# Patient Record
Sex: MALE | Race: WHITE | Employment: OTHER | URBAN - METROPOLITAN AREA
[De-identification: names, ages, dates, MRNs, and addresses within clinical notes are randomized per-mention and may not be internally consistent; named-entity substitution may affect disease eponyms.]

---

## 2017-02-14 RX ORDER — ROSUVASTATIN CALCIUM 20 MG/1
TABLET, COATED ORAL
Qty: 30 TABLET | Refills: 0 | Status: SHIPPED | OUTPATIENT
Start: 2017-02-14 | End: 2017-03-20 | Stop reason: SDUPTHER

## 2017-02-17 ENCOUNTER — OFFICE VISIT (OUTPATIENT)
Dept: CARDIOLOGY CLINIC | Age: 62
End: 2017-02-17

## 2017-02-17 VITALS
SYSTOLIC BLOOD PRESSURE: 138 MMHG | DIASTOLIC BLOOD PRESSURE: 74 MMHG | HEIGHT: 67 IN | HEART RATE: 76 BPM | BODY MASS INDEX: 41.59 KG/M2 | WEIGHT: 265 LBS

## 2017-02-17 DIAGNOSIS — I25.10 CORONARY ARTERY DISEASE INVOLVING NATIVE CORONARY ARTERY OF NATIVE HEART WITHOUT ANGINA PECTORIS: Primary | ICD-10-CM

## 2017-02-17 DIAGNOSIS — E78.00 HYPERCHOLESTEREMIA: ICD-10-CM

## 2017-02-17 DIAGNOSIS — I10 ESSENTIAL HYPERTENSION: ICD-10-CM

## 2017-02-17 PROCEDURE — 99214 OFFICE O/P EST MOD 30 MIN: CPT | Performed by: INTERNAL MEDICINE

## 2017-03-21 RX ORDER — ROSUVASTATIN CALCIUM 20 MG/1
TABLET, COATED ORAL
Qty: 30 TABLET | Refills: 0 | Status: SHIPPED | OUTPATIENT
Start: 2017-03-21 | End: 2017-04-20 | Stop reason: SDUPTHER

## 2017-04-21 RX ORDER — ROSUVASTATIN CALCIUM 20 MG/1
TABLET, COATED ORAL
Qty: 30 TABLET | Refills: 0 | Status: SHIPPED | OUTPATIENT
Start: 2017-04-21 | End: 2017-06-06

## 2017-06-06 RX ORDER — ROSUVASTATIN CALCIUM 20 MG/1
TABLET, COATED ORAL
Qty: 30 TABLET | Refills: 0 | Status: SHIPPED | OUTPATIENT
Start: 2017-06-06 | End: 2017-07-06 | Stop reason: SDUPTHER

## 2017-06-20 ENCOUNTER — TELEPHONE (OUTPATIENT)
Dept: CARDIOLOGY CLINIC | Age: 62
End: 2017-06-20

## 2017-07-10 RX ORDER — CLOPIDOGREL BISULFATE 75 MG/1
TABLET ORAL
Qty: 30 TABLET | Refills: 6 | Status: ON HOLD | OUTPATIENT
Start: 2017-07-10 | End: 2017-12-08 | Stop reason: HOSPADM

## 2017-07-10 RX ORDER — ROSUVASTATIN CALCIUM 20 MG/1
TABLET, COATED ORAL
Qty: 30 TABLET | Refills: 1 | Status: SHIPPED | OUTPATIENT
Start: 2017-07-10 | End: 2017-09-12 | Stop reason: SDUPTHER

## 2017-07-10 RX ORDER — METOPROLOL TARTRATE 50 MG/1
TABLET, FILM COATED ORAL
Qty: 60 TABLET | Refills: 6 | Status: SHIPPED | OUTPATIENT
Start: 2017-07-10 | End: 2018-05-24 | Stop reason: SDUPTHER

## 2017-08-18 ENCOUNTER — OFFICE VISIT (OUTPATIENT)
Dept: CARDIOLOGY CLINIC | Age: 62
End: 2017-08-18

## 2017-08-18 DIAGNOSIS — I25.10 CORONARY ARTERY DISEASE INVOLVING NATIVE CORONARY ARTERY OF NATIVE HEART WITHOUT ANGINA PECTORIS: Primary | ICD-10-CM

## 2017-08-18 DIAGNOSIS — I48.0 PAROXYSMAL ATRIAL FIBRILLATION (HCC): ICD-10-CM

## 2017-08-18 DIAGNOSIS — E78.00 HYPERCHOLESTEREMIA: ICD-10-CM

## 2017-08-18 DIAGNOSIS — I10 ESSENTIAL HYPERTENSION: ICD-10-CM

## 2017-08-18 PROCEDURE — 99999 PR OFFICE/OUTPT VISIT,PROCEDURE ONLY: CPT | Performed by: INTERNAL MEDICINE

## 2017-09-14 RX ORDER — ROSUVASTATIN CALCIUM 20 MG/1
TABLET, COATED ORAL
Qty: 30 TABLET | Refills: 1 | Status: SHIPPED | OUTPATIENT
Start: 2017-09-14 | End: 2017-11-22 | Stop reason: SDUPTHER

## 2017-11-22 RX ORDER — ROSUVASTATIN CALCIUM 20 MG/1
TABLET, COATED ORAL
Qty: 30 TABLET | Refills: 1 | Status: SHIPPED | OUTPATIENT
Start: 2017-11-22 | End: 2018-08-10 | Stop reason: SDUPTHER

## 2017-12-07 PROBLEM — I21.4 NSTEMI (NON-ST ELEVATED MYOCARDIAL INFARCTION) (HCC): Status: ACTIVE | Noted: 2017-12-07

## 2017-12-26 ENCOUNTER — OFFICE VISIT (OUTPATIENT)
Dept: CARDIOLOGY CLINIC | Age: 62
End: 2017-12-26

## 2017-12-26 VITALS
SYSTOLIC BLOOD PRESSURE: 118 MMHG | HEART RATE: 64 BPM | OXYGEN SATURATION: 95 % | DIASTOLIC BLOOD PRESSURE: 64 MMHG | BODY MASS INDEX: 44.22 KG/M2 | WEIGHT: 274 LBS

## 2017-12-26 DIAGNOSIS — I25.110 CORONARY ARTERY DISEASE INVOLVING NATIVE CORONARY ARTERY OF NATIVE HEART WITH UNSTABLE ANGINA PECTORIS (HCC): Chronic | ICD-10-CM

## 2017-12-26 DIAGNOSIS — I10 ESSENTIAL HYPERTENSION: Chronic | ICD-10-CM

## 2017-12-26 DIAGNOSIS — E78.2 MIXED HYPERLIPIDEMIA: Primary | Chronic | ICD-10-CM

## 2017-12-26 PROCEDURE — 99214 OFFICE O/P EST MOD 30 MIN: CPT | Performed by: NURSE PRACTITIONER

## 2017-12-26 NOTE — LETTER
[address] Cardiology [place] [address] [place] [address] [address] [address]  Phone: [phone]  Fax: [address] [facility],         December 26, 2017     Mikhail [facility] [address]    Patient: Trevor Al  MR Number: [id number]  YOB: 1955  Date of Visit: 12/26/2017    Dear Dr. Mikhail Fox:    Thank you for the request for consultation for Trevor Al to me for the evaluation of CAD. Below are the relevant portions of my assessment and plan of care. [address]     Outpatient Follow Up Note    CHIEF COMPLAINT / HPI: Hospital Follow Up secondary to coronary artery disease/ HTN/ Hyperlipidemia     Hospital record has been reviewed  Hospital Course progressed as follows:  [address] [place] [address] Dr Martinez. Mr Stoney Cho with h/o CAD was admitted with chest pain, had NSTEMI, cath was done and did had RCA stent placed. His plavix was changed to prasugrel. Other home medications were continued. Trevor Al is 60 y.o. male who presents today for a routine follow up after a recent hospitalization related to the above mentioned issues. Subjective:   Since the time of discharge, the patient admits their symptoms have significantly improved. Currently the patient denies significant chest pain. There is no associated ADRIAN. The patient is not experiencing palpitations. These symptoms are improving over the last few weeks. With regard to medication therapy the patient has been compliant with prescribed regimen. They have tolerated therapy to date.      Past Medical History:   Diagnosis Date    CAD (coronary artery disease)     Hyperlipidemia     Hypertension      Social History:    History   Smoking Status    Former Smoker    Quit date: 12/28/2000   Smokeless Tobacco    Never Used     Current Medications:  Current Outpatient Prescriptions   Medication Sig Dispense Refill

## 2017-12-26 NOTE — COMMUNICATION BODY
[facility]     Outpatient Follow Up Note    CHIEF COMPLAINT / HPI: Hospital Follow Up secondary to coronary artery disease/ HTN/ Hyperlipidemia     Hospital record has been reviewed  Hospital Course progressed as follows:  [facility] Dr Martinez. Mr Bethany Morales with h/o CAD was admitted with chest pain, had NSTEMI, cath was done and did had RCA stent placed. His plavix was changed to prasugrel. Other home medications were continued. Paula Meyers is 60 y.o. male who presents today for a routine follow up after a recent hospitalization related to the above mentioned issues. Subjective:   Since the time of discharge, the patient admits their symptoms have significantly improved. Currently the patient denies significant chest pain. There is no associated ADRIAN. The patient is not experiencing palpitations. These symptoms are improving over the last few weeks. With regard to medication therapy the patient has been compliant with prescribed regimen. They have tolerated therapy to date.      Past Medical History:   Diagnosis Date    CAD (coronary artery disease)     Hyperlipidemia     Hypertension      Social History:    History   Smoking Status    Former Smoker    Quit date: 12/28/2000   Smokeless Tobacco    Never Used     Current Medications:  Current Outpatient Prescriptions   Medication Sig Dispense Refill    prasugrel (EFFIENT) 10 MG TABS Take 1 tablet by mouth daily 30 tablet 3    nitroGLYCERIN (NITROSTAT) 0.4 MG SL tablet Place 0.4 mg under the tongue      rosuvastatin (CRESTOR) 20 MG tablet TAKE ONE TABLET BY MOUTH ONCE DAILY (Patient taking differently: TAKE ONE TABLET BY MOUTH ONCE nightly) 30 tablet 1    metoprolol tartrate (LOPRESSOR) 50 MG tablet TAKE ONE TABLET BY MOUTH TWICE DAILY 60 tablet 6    aspirin 81 MG tablet Take 4 tablets by mouth daily 30 tablet 11    albuterol sulfate  (90 BASE) MCG/ACT inhaler Inhale 2 puffs into the lungs every 6 hours as needed for Wheezing      Ranitidine HCl (RANITIDINE 75 PO) Take 1 tablet by mouth as needed       losartan (COZAAR) 25 MG tablet Take 25 mg by mouth daily. No current facility-administered medications for this visit. REVIEW OF SYSTEMS:   CONSTITUTIONAL: No major weight gain or loss, fatigue, weakness, night sweats or fever. There's been no change in energy level, sleep pattern, or activity level. HEENT: No new vision difficulties or ringing in the ears. RESPIRATORY: No new SOB, PND, orthopnea or cough. CARDIOVASCULAR: See HPI  GI: No nausea, vomiting, diarrhea, constipation, abdominal pain or changes in bowel habits. : No urinary frequency, urgency, incontinence hematuria or dysuria. SKIN: No cyanosis or skin lesions. MUSCULOSKELETAL: No new muscle or joint pain. NEUROLOGICAL: No syncope or TIA-like symptoms. PSYCHIATRIC: No anxiety, pain, insomnia or depression    Objective:   PHYSICAL EXAM:        VITALS:  /64   Pulse 64   Wt 274 lb (124.3 kg)   SpO2 95%   BMI 44.22 kg/m²      CONSTITUTIONAL: Cooperative, no apparent distress, and appears well nourished / developed  NEUROLOGIC:  Awake and orientated to person, place and time. PSYCH: Calm affect. SKIN: Warm and dry. HEENT: Sclera non-icteric, normocephalic, neck supple, no elevation of JVP, normal carotid pulses with no bruits and thyroid normal size. LUNGS:  No increased work of breathing and clear to auscultation, no crackles or wheezing. CARDIOVASCULAR:  Regular rate and rhythm with no murmurs, gallops, rubs, or abnormal heart sounds, normal PMI. The apical impulses not displaced.                              Heart tones are crisp and normal                                                                Cervical veins are not engorged                 JVP less than 8 cm H2O                                                                              The carotid upstroke is normal in amplitude and contour without delay or bruit ABDOMEN:  Normal bowel sounds, non-distended and non-tender to palpation   EXT: No edema, no calf tenderness. Pulses are present bilaterally. DATA:    Lab Results   Component Value Date    ALT 21 12/07/2017    AST 16 12/07/2017    ALKPHOS 97 12/07/2017    BILITOT 0.3 12/07/2017     Lab Results   Component Value Date    CREATININE 0.9 12/08/2017    BUN 10 12/08/2017     12/08/2017    K 4.2 12/08/2017     12/08/2017    CO2 26 12/08/2017     No results found for: TSH, K5XVDMU, Z2GMDZU, THYROIDAB  Lab Results   Component Value Date    WBC 8.2 12/07/2017    HGB 16.0 12/07/2017    HCT 46.1 12/07/2017    MCV 90.6 12/07/2017     12/07/2017     No components found for: CHLPL  Lab Results   Component Value Date    TRIG 113 10/15/2016    TRIG 167 (H) 07/09/2016    TRIG 228 (H) 12/29/2015     Lab Results   Component Value Date    HDL 36 (L) 10/15/2016    HDL 37 (L) 07/09/2016    HDL 26 (L) 12/29/2015     Lab Results   Component Value Date    LDLCALC 72 10/15/2016    LDLCALC 158 (H) 07/09/2016    LDLCALC 136 (H) 12/29/2015     Lab Results   Component Value Date    LABVLDL 23 10/15/2016    LABVLDL 33 07/09/2016    LABVLDL 46 12/29/2015     Radiology Review:  Pertinent images / reports were reviewed as a part of this visit and reveals the following:    Cardiac Angiogram/ PCI of RCA: 12/7/17  Cath with patent LIMA to LAD,patent SVG to intermediate  95% RCA at proximal stent edge. Normal EF,LVEDP 20  Trop . 11 c/w nonQ MI'     PCI with 3.5 x 12 Alpine DIANA in mid RCA with stent beyond that dilated with 3.5 balloon  Excellent result     LHC 12/15   MVD->CABG   MPI 12/15 NL Normal perfusion   TTE 1/16 60%             Assessment:   Coronary artery disease  12/7/17: Cardiac angiogram/ PCI of RCA- DIANA  Currently on ASA, Lopressor, Losartan, Effient, and Crestor  Patient denies any anginal symptoms at today's office visit    Hypertension  Today's B/P- 118/64  Continue current medications    Hyperlipidemia  On Crestor  10/115/16: HDL: 36, LDL: 72  Patient  is stable since hospital discharge. Plan:   Continue current medications  Patient denied cardiac rehab  Follow up in three months     I have addressed the patient's cardiac risk factors and adjusted pharmacologic treatment as needed. In addition, I have reinforced the need for patient directed risk factor modification. Further evaluation will be based upon the patient's clinical course and testing results. All questions and concerns were addressed to the patient/family. Alternatives to  treatment were discussed. The patient  currently  is not smoking. The risks related to smoking were reviewed with the patient. Recommend maintaining a smoke-free lifestyle. Products available for smoking cessation were discussed. Dual Antiplatelet therapy / anti-coagulation has been recommended / prescribed for this patient. Education conducted on adverse reactions including bleeding was discussed. The patient verbalizes understanding. Pt is on a BB: Lopressor  Pt is on an ace-i/ARB: Losartan  Pt is on a statin  : Crestor  Saturated fat diet discussed  Exercise program discussed    Thank you for allowing to us to participate in the care of Yuliana Amaya CNP

## 2017-12-26 NOTE — PROGRESS NOTES
[address]     Outpatient Follow Up Note    CHIEF COMPLAINT / HPI: Hospital Follow Up secondary to coronary artery disease/ HTN/ Hyperlipidemia     Hospital record has been reviewed  Hospital Course progressed as follows:  [address] Martinez. Mr [place] with h/o CAD was admitted with chest pain, had NSTEMI, cath was done and did had RCA stent placed. His plavix was changed to prasugrel. Other home medications were continued. Verner Dexter is 60 y.o. male who presents today for a routine follow up after a recent hospitalization related to the above mentioned issues. Subjective:   Since the time of discharge, the patient admits their symptoms have significantly improved. Currently the patient denies significant chest pain. There is no associated ADRIAN. The patient is not experiencing palpitations. These symptoms are improving over the last few weeks. With regard to medication therapy the patient has been compliant with prescribed regimen. They have tolerated therapy to date.      Past Medical History:   Diagnosis Date    CAD (coronary artery disease)     Hyperlipidemia     Hypertension      Social History:    History   Smoking Status    Former Smoker    Quit date: 12/28/2000   Smokeless Tobacco    Never Used     Current Medications:  Current Outpatient Prescriptions   Medication Sig Dispense Refill    prasugrel (EFFIENT) 10 MG TABS Take 1 tablet by mouth daily 30 tablet 3    nitroGLYCERIN (NITROSTAT) 0.4 MG SL tablet Place 0.4 mg under the tongue      rosuvastatin (CRESTOR) 20 MG tablet TAKE ONE TABLET BY MOUTH ONCE DAILY (Patient taking differently: TAKE ONE TABLET BY MOUTH ONCE nightly) 30 tablet 1    metoprolol tartrate (LOPRESSOR) 50 MG tablet TAKE ONE TABLET BY MOUTH TWICE DAILY 60 tablet 6    aspirin 81 MG tablet Take 4 tablets by mouth daily 30 tablet 11    albuterol sulfate  (90 BASE) MCG/ACT inhaler Inhale 2 puffs into the lungs every 6 hours as needed for Wheezing      Ranitidine HCl (RANITIDINE 75 PO) Take 1 tablet by mouth as needed       losartan (COZAAR) 25 MG tablet Take 25 mg by mouth daily. No current facility-administered medications for this visit. REVIEW OF SYSTEMS:   CONSTITUTIONAL: No major weight gain or loss, fatigue, weakness, night sweats or fever. There's been no change in energy level, sleep pattern, or activity level. HEENT: No new vision difficulties or ringing in the ears. RESPIRATORY: No new SOB, PND, orthopnea or cough. CARDIOVASCULAR: See HPI  GI: No nausea, vomiting, diarrhea, constipation, abdominal pain or changes in bowel habits. : No urinary frequency, urgency, incontinence hematuria or dysuria. SKIN: No cyanosis or skin lesions. MUSCULOSKELETAL: No new muscle or joint pain. NEUROLOGICAL: No syncope or TIA-like symptoms. PSYCHIATRIC: No anxiety, pain, insomnia or depression    Objective:   PHYSICAL EXAM:        VITALS:  /64   Pulse 64   Wt 274 lb (124.3 kg)   SpO2 95%   BMI 44.22 kg/m²     CONSTITUTIONAL: Cooperative, no apparent distress, and appears well nourished / developed  NEUROLOGIC:  Awake and orientated to person, place and time. PSYCH: Calm affect. SKIN: Warm and dry. HEENT: Sclera non-icteric, normocephalic, neck supple, no elevation of JVP, normal carotid pulses with no bruits and thyroid normal size. LUNGS:  No increased work of breathing and clear to auscultation, no crackles or wheezing. CARDIOVASCULAR:  Regular rate and rhythm with no murmurs, gallops, rubs, or abnormal heart sounds, normal PMI. The apical impulses not displaced.                              Heart tones are crisp and normal                                                                Cervical veins are not engorged                 JVP less than 8 cm H2O                                                                              The carotid upstroke is normal in amplitude and contour without delay or bruit ABDOMEN:  Normal bowel sounds, non-distended and non-tender to palpation   EXT: No edema, no calf tenderness. Pulses are present bilaterally. DATA:    Lab Results   Component Value Date    ALT 21 12/07/2017    AST 16 12/07/2017    ALKPHOS 97 12/07/2017    BILITOT 0.3 12/07/2017     Lab Results   Component Value Date    CREATININE 0.9 12/08/2017    BUN 10 12/08/2017     12/08/2017    K 4.2 12/08/2017     12/08/2017    CO2 26 12/08/2017     No results found for: TSH, J7TKSFK, J2KOELA, THYROIDAB  Lab Results   Component Value Date    WBC 8.2 12/07/2017    HGB 16.0 12/07/2017    HCT 46.1 12/07/2017    MCV 90.6 12/07/2017     12/07/2017     No components found for: CHLPL  Lab Results   Component Value Date    TRIG 113 10/15/2016    TRIG 167 (H) 07/09/2016    TRIG 228 (H) 12/29/2015     Lab Results   Component Value Date    HDL 36 (L) 10/15/2016    HDL 37 (L) 07/09/2016    HDL 26 (L) 12/29/2015     Lab Results   Component Value Date    LDLCALC 72 10/15/2016    LDLCALC 158 (H) 07/09/2016    LDLCALC 136 (H) 12/29/2015     Lab Results   Component Value Date    LABVLDL 23 10/15/2016    LABVLDL 33 07/09/2016    LABVLDL 46 12/29/2015     Radiology Review:  Pertinent images / reports were reviewed as a part of this visit and reveals the following:    Cardiac Angiogram/ PCI of RCA: 12/7/17  Cath with patent LIMA to LAD,patent SVG to intermediate  95% RCA at proximal stent edge. Normal EF,LVEDP 20  Trop . 11 c/w nonQ MI'     PCI with 3.5 x 12 Alpine DIANA in mid RCA with stent beyond that dilated with 3.5 balloon  Excellent result     LHC 12/15   MVD->CABG   MPI 12/15 NL Normal perfusion   TTE 1/16 60%             Assessment:   Coronary artery disease  12/7/17: Cardiac angiogram/ PCI of RCA- DIANA  Currently on ASA, Lopressor, Losartan, Effient, and Crestor  Patient denies any anginal symptoms at today's office visit    Hypertension  Today's B/P- 118/64  Continue current medications    Hyperlipidemia  On Crestor  10/115/16: HDL: 36, LDL: 72  Patient  is stable since hospital discharge. Plan:   Continue current medications  Patient denied cardiac rehab  Follow up in three months     I have addressed the patient's cardiac risk factors and adjusted pharmacologic treatment as needed. In addition, I have reinforced the need for patient directed risk factor modification. Further evaluation will be based upon the patient's clinical course and testing results. All questions and concerns were addressed to the patient/family. Alternatives to  treatment were discussed. The patient  currently  is not smoking. The risks related to smoking were reviewed with the patient. Recommend maintaining a smoke-free lifestyle. Products available for smoking cessation were discussed. Dual Antiplatelet therapy / anti-coagulation has been recommended / prescribed for this patient. Education conducted on adverse reactions including bleeding was discussed. The patient verbalizes understanding. Pt is on a BB: Lopressor  Pt is on an ace-i/ARB: Losartan  Pt is on a statin  : Crestor  Saturated fat diet discussed  Exercise program discussed    Thank you for allowing to us to participate in the care of Yuliana Amaya CNP

## 2018-03-09 ENCOUNTER — TELEPHONE (OUTPATIENT)
Dept: CARDIOLOGY CLINIC | Age: 63
End: 2018-03-09

## 2018-03-28 ENCOUNTER — TELEPHONE (OUTPATIENT)
Dept: CARDIOLOGY CLINIC | Age: 63
End: 2018-03-28

## 2018-03-28 RX ORDER — PRASUGREL 10 MG/1
10 TABLET, FILM COATED ORAL DAILY
Qty: 90 TABLET | Refills: 3 | Status: SHIPPED | OUTPATIENT
Start: 2018-03-28 | End: 2019-03-04 | Stop reason: SDUPTHER

## 2018-03-28 RX ORDER — PRASUGREL 10 MG/1
10 TABLET, FILM COATED ORAL DAILY
Qty: 30 TABLET | Refills: 3 | Status: CANCELLED | OUTPATIENT
Start: 2018-03-28

## 2018-04-02 ENCOUNTER — TELEPHONE (OUTPATIENT)
Dept: CARDIOLOGY CLINIC | Age: 63
End: 2018-04-02

## 2018-05-24 RX ORDER — METOPROLOL TARTRATE 50 MG/1
TABLET, FILM COATED ORAL
Qty: 60 TABLET | Refills: 6 | Status: SHIPPED | OUTPATIENT
Start: 2018-05-24 | End: 2021-01-21 | Stop reason: SDUPTHER

## 2018-08-10 RX ORDER — ROSUVASTATIN CALCIUM 20 MG/1
TABLET, COATED ORAL
Qty: 30 TABLET | Refills: 1 | Status: SHIPPED | OUTPATIENT
Start: 2018-08-10 | End: 2019-08-06

## 2019-03-06 RX ORDER — PRASUGREL 10 MG/1
10 TABLET, FILM COATED ORAL DAILY
Qty: 90 TABLET | Refills: 3 | Status: SHIPPED | OUTPATIENT
Start: 2019-03-06 | End: 2021-01-04 | Stop reason: SDUPTHER

## 2019-07-13 ENCOUNTER — APPOINTMENT (OUTPATIENT)
Dept: GENERAL RADIOLOGY | Age: 64
DRG: 247 | End: 2019-07-13
Payer: COMMERCIAL

## 2019-07-13 ENCOUNTER — HOSPITAL ENCOUNTER (INPATIENT)
Age: 64
LOS: 1 days | Discharge: HOME | DRG: 247 | End: 2019-07-16
Attending: EMERGENCY MEDICINE | Admitting: PEDIATRICS
Payer: COMMERCIAL

## 2019-07-13 DIAGNOSIS — R07.9 CHEST PAIN, UNSPECIFIED TYPE: Primary | ICD-10-CM

## 2019-07-13 LAB
A/G RATIO: 1.2 (ref 1.1–2.2)
ALBUMIN SERPL-MCNC: 3.9 G/DL (ref 3.4–5)
ALP BLD-CCNC: 102 U/L (ref 40–129)
ALT SERPL-CCNC: 18 U/L (ref 10–40)
ANION GAP SERPL CALCULATED.3IONS-SCNC: 11 MMOL/L (ref 3–16)
AST SERPL-CCNC: 20 U/L (ref 15–37)
BASOPHILS ABSOLUTE: 0.1 K/UL (ref 0–0.2)
BASOPHILS RELATIVE PERCENT: 1.1 %
BILIRUB SERPL-MCNC: 0.3 MG/DL (ref 0–1)
BUN BLDV-MCNC: 10 MG/DL (ref 7–20)
CALCIUM SERPL-MCNC: 9.4 MG/DL (ref 8.3–10.6)
CHLORIDE BLD-SCNC: 103 MMOL/L (ref 99–110)
CO2: 24 MMOL/L (ref 21–32)
CREAT SERPL-MCNC: 0.9 MG/DL (ref 0.8–1.3)
EKG ATRIAL RATE: 79 BPM
EKG DIAGNOSIS: NORMAL
EKG P AXIS: 40 DEGREES
EKG P-R INTERVAL: 206 MS
EKG Q-T INTERVAL: 392 MS
EKG QRS DURATION: 82 MS
EKG QTC CALCULATION (BAZETT): 449 MS
EKG R AXIS: 9 DEGREES
EKG T AXIS: 80 DEGREES
EKG VENTRICULAR RATE: 79 BPM
EOSINOPHILS ABSOLUTE: 0.2 K/UL (ref 0–0.6)
EOSINOPHILS RELATIVE PERCENT: 2.8 %
GFR AFRICAN AMERICAN: >60
GFR NON-AFRICAN AMERICAN: >60
GLOBULIN: 3.3 G/DL
GLUCOSE BLD-MCNC: 121 MG/DL (ref 70–99)
HCT VFR BLD CALC: 47.3 % (ref 40.5–52.5)
HEMOGLOBIN: 15.8 G/DL (ref 13.5–17.5)
LYMPHOCYTES ABSOLUTE: 1.9 K/UL (ref 1–5.1)
LYMPHOCYTES RELATIVE PERCENT: 23.4 %
MCH RBC QN AUTO: 30.5 PG (ref 26–34)
MCHC RBC AUTO-ENTMCNC: 33.4 G/DL (ref 31–36)
MCV RBC AUTO: 91.2 FL (ref 80–100)
MONOCYTES ABSOLUTE: 0.5 K/UL (ref 0–1.3)
MONOCYTES RELATIVE PERCENT: 6 %
NEUTROPHILS ABSOLUTE: 5.4 K/UL (ref 1.7–7.7)
NEUTROPHILS RELATIVE PERCENT: 66.7 %
PDW BLD-RTO: 15.1 % (ref 12.4–15.4)
PLATELET # BLD: 254 K/UL (ref 135–450)
PMV BLD AUTO: 6.8 FL (ref 5–10.5)
POTASSIUM REFLEX MAGNESIUM: 4.5 MMOL/L (ref 3.5–5.1)
RBC # BLD: 5.18 M/UL (ref 4.2–5.9)
SODIUM BLD-SCNC: 138 MMOL/L (ref 136–145)
TOTAL PROTEIN: 7.2 G/DL (ref 6.4–8.2)
TROPONIN: <0.01 NG/ML
WBC # BLD: 8.1 K/UL (ref 4–11)

## 2019-07-13 PROCEDURE — 2580000003 HC RX 258: Performed by: PHYSICIAN ASSISTANT

## 2019-07-13 PROCEDURE — 96361 HYDRATE IV INFUSION ADD-ON: CPT

## 2019-07-13 PROCEDURE — 99285 EMERGENCY DEPT VISIT HI MDM: CPT

## 2019-07-13 PROCEDURE — 6360000002 HC RX W HCPCS: Performed by: PHYSICIAN ASSISTANT

## 2019-07-13 PROCEDURE — 80053 COMPREHEN METABOLIC PANEL: CPT

## 2019-07-13 PROCEDURE — G0378 HOSPITAL OBSERVATION PER HR: HCPCS

## 2019-07-13 PROCEDURE — 93005 ELECTROCARDIOGRAM TRACING: CPT | Performed by: EMERGENCY MEDICINE

## 2019-07-13 PROCEDURE — 84484 ASSAY OF TROPONIN QUANT: CPT

## 2019-07-13 PROCEDURE — 6370000000 HC RX 637 (ALT 250 FOR IP): Performed by: PEDIATRICS

## 2019-07-13 PROCEDURE — 6370000000 HC RX 637 (ALT 250 FOR IP): Performed by: PHYSICIAN ASSISTANT

## 2019-07-13 PROCEDURE — 71045 X-RAY EXAM CHEST 1 VIEW: CPT

## 2019-07-13 PROCEDURE — 85025 COMPLETE CBC W/AUTO DIFF WBC: CPT

## 2019-07-13 PROCEDURE — 96374 THER/PROPH/DIAG INJ IV PUSH: CPT

## 2019-07-13 RX ORDER — ONDANSETRON 2 MG/ML
4 INJECTION INTRAMUSCULAR; INTRAVENOUS ONCE
Status: COMPLETED | OUTPATIENT
Start: 2019-07-13 | End: 2019-07-13

## 2019-07-13 RX ORDER — METOPROLOL TARTRATE 50 MG/1
50 TABLET, FILM COATED ORAL 2 TIMES DAILY
Status: DISCONTINUED | OUTPATIENT
Start: 2019-07-13 | End: 2019-07-16 | Stop reason: HOSPADM

## 2019-07-13 RX ORDER — SODIUM CHLORIDE 0.9 % (FLUSH) 0.9 %
10 SYRINGE (ML) INJECTION EVERY 12 HOURS SCHEDULED
Status: DISCONTINUED | OUTPATIENT
Start: 2019-07-13 | End: 2019-07-16 | Stop reason: HOSPADM

## 2019-07-13 RX ORDER — ONDANSETRON 2 MG/ML
4 INJECTION INTRAMUSCULAR; INTRAVENOUS EVERY 6 HOURS PRN
Status: DISCONTINUED | OUTPATIENT
Start: 2019-07-13 | End: 2019-07-16 | Stop reason: HOSPADM

## 2019-07-13 RX ORDER — LOSARTAN POTASSIUM 25 MG/1
25 TABLET ORAL DAILY
Status: DISCONTINUED | OUTPATIENT
Start: 2019-07-14 | End: 2019-07-15

## 2019-07-13 RX ORDER — SODIUM CHLORIDE 0.9 % (FLUSH) 0.9 %
10 SYRINGE (ML) INJECTION PRN
Status: DISCONTINUED | OUTPATIENT
Start: 2019-07-13 | End: 2019-07-16 | Stop reason: HOSPADM

## 2019-07-13 RX ORDER — ASPIRIN 325 MG
325 TABLET ORAL ONCE
Status: COMPLETED | OUTPATIENT
Start: 2019-07-13 | End: 2019-07-13

## 2019-07-13 RX ORDER — ALBUTEROL SULFATE 90 UG/1
2 AEROSOL, METERED RESPIRATORY (INHALATION) EVERY 6 HOURS PRN
Status: DISCONTINUED | OUTPATIENT
Start: 2019-07-13 | End: 2019-07-16 | Stop reason: HOSPADM

## 2019-07-13 RX ORDER — PRASUGREL 10 MG/1
10 TABLET, FILM COATED ORAL DAILY
Status: DISCONTINUED | OUTPATIENT
Start: 2019-07-14 | End: 2019-07-16 | Stop reason: HOSPADM

## 2019-07-13 RX ORDER — 0.9 % SODIUM CHLORIDE 0.9 %
1000 INTRAVENOUS SOLUTION INTRAVENOUS ONCE
Status: COMPLETED | OUTPATIENT
Start: 2019-07-13 | End: 2019-07-13

## 2019-07-13 RX ORDER — ASPIRIN 325 MG
325 TABLET ORAL DAILY
Status: DISCONTINUED | OUTPATIENT
Start: 2019-07-14 | End: 2019-07-16 | Stop reason: HOSPADM

## 2019-07-13 RX ORDER — ROSUVASTATIN CALCIUM 20 MG/1
20 TABLET, COATED ORAL DAILY
Status: DISCONTINUED | OUTPATIENT
Start: 2019-07-14 | End: 2019-07-16 | Stop reason: HOSPADM

## 2019-07-13 RX ORDER — NITROGLYCERIN 0.4 MG/1
0.4 TABLET SUBLINGUAL ONCE
Status: COMPLETED | OUTPATIENT
Start: 2019-07-13 | End: 2019-07-13

## 2019-07-13 RX ADMIN — ASPIRIN 325 MG ORAL TABLET 325 MG: 325 PILL ORAL at 20:40

## 2019-07-13 RX ADMIN — NITROGLYCERIN 0.4 MG: 0.4 TABLET, ORALLY DISINTEGRATING SUBLINGUAL at 20:40

## 2019-07-13 RX ADMIN — ONDANSETRON 4 MG: 2 INJECTION INTRAMUSCULAR; INTRAVENOUS at 20:40

## 2019-07-13 RX ADMIN — SODIUM CHLORIDE 1000 ML: 9 INJECTION, SOLUTION INTRAVENOUS at 20:40

## 2019-07-13 ASSESSMENT — ENCOUNTER SYMPTOMS
VOMITING: 0
SHORTNESS OF BREATH: 0
WHEEZING: 0
NAUSEA: 0
COLOR CHANGE: 0
ABDOMINAL PAIN: 0

## 2019-07-13 ASSESSMENT — HEART SCORE
ECG: 0
ECG: 0

## 2019-07-13 ASSESSMENT — PAIN SCALES - GENERAL
PAINLEVEL_OUTOF10: 7
PAINLEVEL_OUTOF10: 0
PAINLEVEL_OUTOF10: 6

## 2019-07-13 ASSESSMENT — PAIN DESCRIPTION - PAIN TYPE: TYPE: ACUTE PAIN

## 2019-07-13 ASSESSMENT — PAIN DESCRIPTION - LOCATION: LOCATION: CHEST

## 2019-07-14 LAB
EKG ATRIAL RATE: 71 BPM
EKG DIAGNOSIS: NORMAL
EKG P AXIS: 65 DEGREES
EKG P-R INTERVAL: 214 MS
EKG Q-T INTERVAL: 396 MS
EKG QRS DURATION: 88 MS
EKG QTC CALCULATION (BAZETT): 430 MS
EKG R AXIS: 21 DEGREES
EKG T AXIS: 78 DEGREES
EKG VENTRICULAR RATE: 71 BPM
TROPONIN: 0.03 NG/ML
TROPONIN: <0.01 NG/ML

## 2019-07-14 PROCEDURE — 6360000002 HC RX W HCPCS: Performed by: PEDIATRICS

## 2019-07-14 PROCEDURE — G0378 HOSPITAL OBSERVATION PER HR: HCPCS

## 2019-07-14 PROCEDURE — 96372 THER/PROPH/DIAG INJ SC/IM: CPT

## 2019-07-14 PROCEDURE — 94761 N-INVAS EAR/PLS OXIMETRY MLT: CPT

## 2019-07-14 PROCEDURE — 99223 1ST HOSP IP/OBS HIGH 75: CPT | Performed by: INTERNAL MEDICINE

## 2019-07-14 PROCEDURE — 6370000000 HC RX 637 (ALT 250 FOR IP): Performed by: PEDIATRICS

## 2019-07-14 PROCEDURE — 2580000003 HC RX 258: Performed by: PEDIATRICS

## 2019-07-14 PROCEDURE — 93010 ELECTROCARDIOGRAM REPORT: CPT | Performed by: INTERNAL MEDICINE

## 2019-07-14 PROCEDURE — 36415 COLL VENOUS BLD VENIPUNCTURE: CPT

## 2019-07-14 PROCEDURE — 84484 ASSAY OF TROPONIN QUANT: CPT

## 2019-07-14 PROCEDURE — 6370000000 HC RX 637 (ALT 250 FOR IP): Performed by: NURSE PRACTITIONER

## 2019-07-14 PROCEDURE — 93005 ELECTROCARDIOGRAM TRACING: CPT | Performed by: NURSE PRACTITIONER

## 2019-07-14 RX ORDER — NITROGLYCERIN 0.4 MG/1
0.4 TABLET SUBLINGUAL EVERY 5 MIN PRN
Status: DISCONTINUED | OUTPATIENT
Start: 2019-07-14 | End: 2019-07-16 | Stop reason: HOSPADM

## 2019-07-14 RX ORDER — MORPHINE SULFATE 2 MG/ML
2 INJECTION, SOLUTION INTRAMUSCULAR; INTRAVENOUS
Status: DISCONTINUED | OUTPATIENT
Start: 2019-07-14 | End: 2019-07-16 | Stop reason: HOSPADM

## 2019-07-14 RX ADMIN — NITROGLYCERIN 0.4 MG: 0.4 TABLET, ORALLY DISINTEGRATING SUBLINGUAL at 18:08

## 2019-07-14 RX ADMIN — PRASUGREL HYDROCHLORIDE 10 MG: 10 TABLET, FILM COATED ORAL at 09:23

## 2019-07-14 RX ADMIN — METOPROLOL TARTRATE 50 MG: 50 TABLET, FILM COATED ORAL at 09:23

## 2019-07-14 RX ADMIN — METOPROLOL TARTRATE 50 MG: 50 TABLET, FILM COATED ORAL at 20:57

## 2019-07-14 RX ADMIN — ASPIRIN 325 MG ORAL TABLET 325 MG: 325 PILL ORAL at 09:23

## 2019-07-14 RX ADMIN — ENOXAPARIN SODIUM 120 MG: 100 INJECTION SUBCUTANEOUS at 00:48

## 2019-07-14 RX ADMIN — ENOXAPARIN SODIUM 120 MG: 100 INJECTION SUBCUTANEOUS at 09:29

## 2019-07-14 RX ADMIN — Medication 10 ML: at 00:47

## 2019-07-14 RX ADMIN — METOPROLOL TARTRATE 50 MG: 50 TABLET, FILM COATED ORAL at 00:47

## 2019-07-14 RX ADMIN — ENOXAPARIN SODIUM 120 MG: 100 INJECTION SUBCUTANEOUS at 20:57

## 2019-07-14 RX ADMIN — ROSUVASTATIN CALCIUM 20 MG: 20 TABLET, FILM COATED ORAL at 09:23

## 2019-07-14 RX ADMIN — NITROGLYCERIN 0.4 MG: 0.4 TABLET, ORALLY DISINTEGRATING SUBLINGUAL at 10:27

## 2019-07-14 RX ADMIN — Medication 10 ML: at 09:33

## 2019-07-14 RX ADMIN — LOSARTAN POTASSIUM 25 MG: 25 TABLET ORAL at 09:23

## 2019-07-14 RX ADMIN — Medication 10 ML: at 20:57

## 2019-07-14 ASSESSMENT — PAIN SCALES - GENERAL
PAINLEVEL_OUTOF10: 7
PAINLEVEL_OUTOF10: 0
PAINLEVEL_OUTOF10: 0
PAINLEVEL_OUTOF10: 1
PAINLEVEL_OUTOF10: 0

## 2019-07-14 ASSESSMENT — PAIN DESCRIPTION - PAIN TYPE
TYPE: ACUTE PAIN
TYPE: ACUTE PAIN

## 2019-07-14 ASSESSMENT — PAIN DESCRIPTION - LOCATION
LOCATION: CHEST
LOCATION: CHEST

## 2019-07-14 ASSESSMENT — PAIN DESCRIPTION - ORIENTATION: ORIENTATION: MID

## 2019-07-14 ASSESSMENT — PAIN DESCRIPTION - DIRECTION: RADIATING_TOWARDS: LEFT ARM
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
## 2019-07-14 NOTE — CONSULTS
System:  All other systems reviewed except for that noted above. Pertinent negatives and positives are:       · General: negative for fever, chills   · Ophthalmic ROS: negative for - eye pain or loss of vision  · ENT ROS: negative for - headaches, sore throat   · Respiratory: negative for - cough, sputum  · Cardiovascular: Reviewed in HPI  · Gastrointestinal: negative for - abdominal pain, diarrhea, N/V  · Hematology: negative for - bleeding, blood clots, bruising or jaundice  · Genito-Urinary:  negative for - Dysuria or incontinence  · Musculoskeletal: negative for - Joint swelling, muscle pain  · Neurological: negative for - confusion, dizziness, headaches   · Psychiatric: No anxiety, no depression. · Dermatological: negative for - rash    Physical Examination:  Vitals:    19 0915   BP: 126/72   Pulse: 72   Resp: 20   Temp: 98.1 °F (36.7 °C)   SpO2: 92%      In: 20 [I.V.:20]  Out: -    Wt Readings from Last 3 Encounters:   19 272 lb 4.8 oz (123.5 kg)   17 274 lb (124.3 kg)   17 260 lb (117.9 kg)     Temp  Av.8 °F (36.6 °C)  Min: 97 °F (36.1 °C)  Max: 98.2 °F (36.8 °C)  Pulse  Av.9  Min: 55  Max: 79  BP  Min: 114/67  Max: 199/68  SpO2  Av.4 %  Min: 92 %  Max: 98 %    Intake/Output Summary (Last 24 hours) at 2019 1126  Last data filed at 2019 0933  Gross per 24 hour   Intake 20 ml   Output --   Net 20 ml       · Telemetry: Sinus rhythm   · Constitutional: Oriented. No distress. · Head: Normocephalic and atraumatic. · Mouth/Throat: Oropharynx is clear and moist.   · Eyes: Conjunctivae normal. EOM are normal.   · Neck: Neck supple. No rigidity. No JVD present. · Cardiovascular: Normal rate, regular rhythm, S1&S2. · Pulmonary/Chest: Bilateral respiratory sounds. No wheezes, No rhonchi. · Abdominal: Soft. Bowel sounds present. No distension, No tenderness. + Obese  · Musculoskeletal: No tenderness. No edema    · Lymphadenopathy: Has no cervical adenopathy. · Neurological: Alert and oriented. Cranial nerve appears intact, No Gross deficit   · Skin: Skin is warm and dry. No rash noted. · Psychiatric: Has a normal behavior     Labs, diagnostic and imaging results reviewed. Reviewed. Recent Labs     07/13/19 2019      K 4.5      CO2 24   BUN 10   CREATININE 0.9     Recent Labs     07/13/19 2019   WBC 8.1   HGB 15.8   HCT 47.3   MCV 91.2        Lab Results   Component Value Date    TROPONINI 0.03 07/14/2019     Lab Results   Component Value Date     01/31/2014    BNP 16 01/04/2013     Lab Results   Component Value Date    PROTIME 11.9 12/29/2015    INR 1.04 12/29/2015     Lab Results   Component Value Date    CHOL 131 10/15/2016    HDL 36 10/15/2016    TRIG 113 10/15/2016       ECG: Sinus rhythm with prolonged HI interval  Echo: 2017  -Bandages on Sternum limited this 2D LVED echocardiogram.   -Left ventricle size is normal.   -Normal left ventricular wall thickness.   -Overall left ventricular function is normal.   -Ejection fraction is visually estimated to be 55-60 %. Myoview 2015   -Normal myocardial perfusion.    -Normal LV function.    -Abnormal EKG response. with development of LBBB and intermittent incomplete    LBBB.    -Chest discomfort with lexiscan infusion. Cath: 2017  Cath with patent LIMA to LAD,patent SVG to intermediate  95% RCA at proximal stent edge. Normal EF,LVEDP 20  Trop . 11 c/w nonQ MI'     PCI with 3.5 x 12 Alpine DIANA in mid RCA with stent beyond that dilated with 3.5 balloon  Excellent result    Scheduled Meds:   losartan  25 mg Oral Daily    aspirin  325 mg Oral Daily    metoprolol tartrate  50 mg Oral BID    prasugrel  10 mg Oral Daily    rosuvastatin  20 mg Oral Daily    sodium chloride flush  10 mL Intravenous 2 times per day    enoxaparin  1 mg/kg Subcutaneous BID    pneumococcal 13-valent conjugate  0.5 mL Intramuscular Prior to discharge     Continuous Infusions:  PRN Meds:.morphine,

## 2019-07-15 LAB
EKG ATRIAL RATE: 74 BPM
EKG DIAGNOSIS: NORMAL
EKG P AXIS: 52 DEGREES
EKG P-R INTERVAL: 218 MS
EKG Q-T INTERVAL: 390 MS
EKG QRS DURATION: 84 MS
EKG QTC CALCULATION (BAZETT): 432 MS
EKG R AXIS: 26 DEGREES
EKG T AXIS: 81 DEGREES
EKG VENTRICULAR RATE: 74 BPM
LEFT VENTRICULAR EJECTION FRACTION HIGH VALUE: 60 %
LEFT VENTRICULAR EJECTION FRACTION MODE: NORMAL
POC ACT LR: 246 SEC

## 2019-07-15 PROCEDURE — 99152 MOD SED SAME PHYS/QHP 5/>YRS: CPT

## 2019-07-15 PROCEDURE — B2111ZZ FLUOROSCOPY OF MULTIPLE CORONARY ARTERIES USING LOW OSMOLAR CONTRAST: ICD-10-PCS | Performed by: INTERNAL MEDICINE

## 2019-07-15 PROCEDURE — 99152 MOD SED SAME PHYS/QHP 5/>YRS: CPT | Performed by: INTERNAL MEDICINE

## 2019-07-15 PROCEDURE — 6370000000 HC RX 637 (ALT 250 FOR IP): Performed by: PEDIATRICS

## 2019-07-15 PROCEDURE — 92928 PRQ TCAT PLMT NTRAC ST 1 LES: CPT | Performed by: INTERNAL MEDICINE

## 2019-07-15 PROCEDURE — B2151ZZ FLUOROSCOPY OF LEFT HEART USING LOW OSMOLAR CONTRAST: ICD-10-PCS | Performed by: INTERNAL MEDICINE

## 2019-07-15 PROCEDURE — C1760 CLOSURE DEV, VASC: HCPCS

## 2019-07-15 PROCEDURE — C1894 INTRO/SHEATH, NON-LASER: HCPCS

## 2019-07-15 PROCEDURE — C9600 PERC DRUG-EL COR STENT SING: HCPCS

## 2019-07-15 PROCEDURE — 99153 MOD SED SAME PHYS/QHP EA: CPT

## 2019-07-15 PROCEDURE — 6370000000 HC RX 637 (ALT 250 FOR IP): Performed by: NURSE PRACTITIONER

## 2019-07-15 PROCEDURE — 6360000004 HC RX CONTRAST MEDICATION: Performed by: INTERNAL MEDICINE

## 2019-07-15 PROCEDURE — 94760 N-INVAS EAR/PLS OXIMETRY 1: CPT

## 2019-07-15 PROCEDURE — 93459 L HRT ART/GRFT ANGIO: CPT | Performed by: INTERNAL MEDICINE

## 2019-07-15 PROCEDURE — 93010 ELECTROCARDIOGRAM REPORT: CPT | Performed by: INTERNAL MEDICINE

## 2019-07-15 PROCEDURE — 2709999900 HC NON-CHARGEABLE SUPPLY

## 2019-07-15 PROCEDURE — 2580000003 HC RX 258: Performed by: INTERNAL MEDICINE

## 2019-07-15 PROCEDURE — C1874 STENT, COATED/COV W/DEL SYS: HCPCS

## 2019-07-15 PROCEDURE — 027034Z DILATION OF CORONARY ARTERY, ONE ARTERY WITH DRUG-ELUTING INTRALUMINAL DEVICE, PERCUTANEOUS APPROACH: ICD-10-PCS | Performed by: INTERNAL MEDICINE

## 2019-07-15 PROCEDURE — C1769 GUIDE WIRE: HCPCS

## 2019-07-15 PROCEDURE — 2580000003 HC RX 258

## 2019-07-15 PROCEDURE — 2580000003 HC RX 258: Performed by: PEDIATRICS

## 2019-07-15 PROCEDURE — 4A023N7 MEASUREMENT OF CARDIAC SAMPLING AND PRESSURE, LEFT HEART, PERCUTANEOUS APPROACH: ICD-10-PCS | Performed by: INTERNAL MEDICINE

## 2019-07-15 PROCEDURE — 6360000002 HC RX W HCPCS

## 2019-07-15 PROCEDURE — 2500000003 HC RX 250 WO HCPCS

## 2019-07-15 PROCEDURE — 93459 L HRT ART/GRFT ANGIO: CPT

## 2019-07-15 PROCEDURE — 85347 COAGULATION TIME ACTIVATED: CPT

## 2019-07-15 PROCEDURE — B2131ZZ FLUOROSCOPY OF MULTIPLE CORONARY ARTERY BYPASS GRAFTS USING LOW OSMOLAR CONTRAST: ICD-10-PCS | Performed by: INTERNAL MEDICINE

## 2019-07-15 PROCEDURE — 1200000000 HC SEMI PRIVATE

## 2019-07-15 PROCEDURE — C1725 CATH, TRANSLUMIN NON-LASER: HCPCS

## 2019-07-15 PROCEDURE — 6370000000 HC RX 637 (ALT 250 FOR IP): Performed by: INTERNAL MEDICINE

## 2019-07-15 PROCEDURE — C1887 CATHETER, GUIDING: HCPCS

## 2019-07-15 RX ORDER — VALSARTAN 160 MG/1
160 TABLET ORAL DAILY
Status: DISCONTINUED | OUTPATIENT
Start: 2019-07-15 | End: 2019-07-16

## 2019-07-15 RX ORDER — SODIUM CHLORIDE 9 MG/ML
INJECTION, SOLUTION INTRAVENOUS CONTINUOUS
Status: ACTIVE | OUTPATIENT
Start: 2019-07-15 | End: 2019-07-15

## 2019-07-15 RX ORDER — SODIUM CHLORIDE 0.9 % (FLUSH) 0.9 %
10 SYRINGE (ML) INJECTION EVERY 12 HOURS SCHEDULED
Status: DISCONTINUED | OUTPATIENT
Start: 2019-07-15 | End: 2019-07-16 | Stop reason: HOSPADM

## 2019-07-15 RX ORDER — ACETAMINOPHEN 325 MG/1
650 TABLET ORAL EVERY 4 HOURS PRN
Status: DISCONTINUED | OUTPATIENT
Start: 2019-07-15 | End: 2019-07-16 | Stop reason: HOSPADM

## 2019-07-15 RX ORDER — SODIUM CHLORIDE 0.9 % (FLUSH) 0.9 %
10 SYRINGE (ML) INJECTION PRN
Status: DISCONTINUED | OUTPATIENT
Start: 2019-07-15 | End: 2019-07-16 | Stop reason: HOSPADM

## 2019-07-15 RX ADMIN — METOPROLOL TARTRATE 50 MG: 50 TABLET, FILM COATED ORAL at 20:55

## 2019-07-15 RX ADMIN — Medication 10 ML: at 20:56

## 2019-07-15 RX ADMIN — VALSARTAN 160 MG: 160 TABLET, FILM COATED ORAL at 13:42

## 2019-07-15 RX ADMIN — LOSARTAN POTASSIUM 25 MG: 25 TABLET ORAL at 08:48

## 2019-07-15 RX ADMIN — Medication 10 ML: at 08:49

## 2019-07-15 RX ADMIN — ROSUVASTATIN CALCIUM 20 MG: 20 TABLET, FILM COATED ORAL at 13:42

## 2019-07-15 RX ADMIN — PRASUGREL HYDROCHLORIDE 10 MG: 10 TABLET, FILM COATED ORAL at 08:48

## 2019-07-15 RX ADMIN — IOPAMIDOL 136 ML: 755 INJECTION, SOLUTION INTRAVENOUS at 10:25

## 2019-07-15 RX ADMIN — METOPROLOL TARTRATE 50 MG: 50 TABLET, FILM COATED ORAL at 08:47

## 2019-07-15 RX ADMIN — ASPIRIN 325 MG ORAL TABLET 325 MG: 325 PILL ORAL at 08:47

## 2019-07-15 ASSESSMENT — PAIN SCALES - GENERAL
PAINLEVEL_OUTOF10: 0
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
## 2019-07-16 VITALS
TEMPERATURE: 97.4 F | SYSTOLIC BLOOD PRESSURE: 127 MMHG | WEIGHT: 268.3 LBS | HEART RATE: 71 BPM | OXYGEN SATURATION: 98 % | BODY MASS INDEX: 42.11 KG/M2 | RESPIRATION RATE: 16 BRPM | HEIGHT: 67 IN | DIASTOLIC BLOOD PRESSURE: 56 MMHG

## 2019-07-16 LAB
ANION GAP SERPL CALCULATED.3IONS-SCNC: 11 MMOL/L (ref 3–16)
BUN BLDV-MCNC: 13 MG/DL (ref 7–20)
CALCIUM SERPL-MCNC: 8.7 MG/DL (ref 8.3–10.6)
CHLORIDE BLD-SCNC: 106 MMOL/L (ref 99–110)
CHOLESTEROL, TOTAL: 155 MG/DL (ref 0–199)
CO2: 26 MMOL/L (ref 21–32)
CREAT SERPL-MCNC: 0.9 MG/DL (ref 0.8–1.3)
GFR AFRICAN AMERICAN: >60
GFR NON-AFRICAN AMERICAN: >60
GLUCOSE BLD-MCNC: 95 MG/DL (ref 70–99)
HDLC SERPL-MCNC: 26 MG/DL (ref 40–60)
LDL CHOLESTEROL CALCULATED: 92 MG/DL
POTASSIUM SERPL-SCNC: 4.5 MMOL/L (ref 3.5–5.1)
SODIUM BLD-SCNC: 143 MMOL/L (ref 136–145)
TRIGL SERPL-MCNC: 187 MG/DL (ref 0–150)
VLDLC SERPL CALC-MCNC: 37 MG/DL

## 2019-07-16 PROCEDURE — 99233 SBSQ HOSP IP/OBS HIGH 50: CPT | Performed by: INTERNAL MEDICINE

## 2019-07-16 PROCEDURE — 90670 PCV13 VACCINE IM: CPT | Performed by: NURSE PRACTITIONER

## 2019-07-16 PROCEDURE — 6370000000 HC RX 637 (ALT 250 FOR IP): Performed by: INTERNAL MEDICINE

## 2019-07-16 PROCEDURE — 80048 BASIC METABOLIC PNL TOTAL CA: CPT

## 2019-07-16 PROCEDURE — 80061 LIPID PANEL: CPT

## 2019-07-16 PROCEDURE — G0009 ADMIN PNEUMOCOCCAL VACCINE: HCPCS | Performed by: NURSE PRACTITIONER

## 2019-07-16 PROCEDURE — 6360000002 HC RX W HCPCS: Performed by: NURSE PRACTITIONER

## 2019-07-16 PROCEDURE — 6370000000 HC RX 637 (ALT 250 FOR IP): Performed by: NURSE PRACTITIONER

## 2019-07-16 RX ORDER — LOSARTAN POTASSIUM 25 MG/1
25 TABLET ORAL DAILY
Status: DISCONTINUED | OUTPATIENT
Start: 2019-07-16 | End: 2019-07-16 | Stop reason: HOSPADM

## 2019-07-16 RX ADMIN — ASPIRIN 325 MG ORAL TABLET 325 MG: 325 PILL ORAL at 08:11

## 2019-07-16 RX ADMIN — PNEUMOCOCCAL 13-VALENT CONJUGATE VACCINE 0.5 ML: 2.2; 2.2; 2.2; 2.2; 2.2; 4.4; 2.2; 2.2; 2.2; 2.2; 2.2; 2.2; 2.2 INJECTION, SUSPENSION INTRAMUSCULAR at 08:11

## 2019-07-16 RX ADMIN — PRASUGREL HYDROCHLORIDE 10 MG: 10 TABLET, FILM COATED ORAL at 08:10

## 2019-07-16 RX ADMIN — METOPROLOL TARTRATE 50 MG: 50 TABLET, FILM COATED ORAL at 08:11

## 2019-07-16 RX ADMIN — ROSUVASTATIN CALCIUM 20 MG: 20 TABLET, FILM COATED ORAL at 08:10

## 2019-07-16 RX ADMIN — LOSARTAN POTASSIUM 25 MG: 25 TABLET ORAL at 08:11

## 2019-07-16 ASSESSMENT — PAIN SCALES - GENERAL
PAINLEVEL_OUTOF10: 0
PAINLEVEL_OUTOF10: 0

## 2019-07-16 NOTE — DISCHARGE SUMMARY
[facility]    Patient Demographics    Kathy. Charles Galvan  Date of Birth. 1955  MRN. [id number]     Primary care provider. Jania Tabares MD  (Tel: [phone])    Admit date: 7/13/2019    Discharge date (blank if same as Note Date): 7/16/2019  Note Date: 7/16/2019     Reason for Hospitalization. Chief Complaint   Patient presents with    Chest Pain     chest pain that radiates down L arm that started this morning. getting worse, with sob and nausea. Significant Findings. Principal Problem:    Unstable angina [facility])  Active Problems:    Hyperlipemia    Essential hypertension    Chest pain    Coronary artery disease involving native coronary artery of native heart with unstable angina pectoris ([address]    Problems and results from this hospitalization that need follow up. 1. Coronary artery disease-post cath follow-up    Significant test results and incidental findings. 1. Cathresults see below    Invasive procedures and treatments. 1. Left heart cath by Dr. Lia Nair on July 15     Cath with COLEMAN to LAD patent,SVG to either intermediate or diag is widely patent     RCA widely patent with previously placed stent. 100% LAD, OM1 with marked progression of disease with 95% OM1  EF 65%     Discussed need for smoking cessation. Likely crestor not effectively controlling lipids     PCI with 3 x18 [address] stent with excellent angiographic result  [facility] Course. Patient is a pleasant 69-year-old male with a history of coronary artery disease who presented with left-sided chest pain radiating down his left arm into his neck and jaw. It worsened with exertion and improved with rest.  It was also associated with nausea. Initial troponin in the emergency room was negative. EKG revealed first-degree AV block. He was admitted and started on full dose Lovenox.   Serial DAILY  Notes to patient:  Use: treat heart failure, prevent future heart attacks, lower blood pressure and heart rate, treat chest pain  Side effects: dizziness, fatigue, and diarrhea     nitroGLYCERIN 0.4 MG SL tablet  Commonly known as:  NITROSTAT  Notes to patient:  Use: Treat chest pain  Side effects: headache, flushing, dizziness    ** Take 1 tablet every 5 minutes for chest pain up to 3 times. Call doctor right away. prasugrel 10 MG Tabs  Commonly known as:  EFFIENT  TAKE 1 TABLET BY MOUTH DAILY  Notes to patient:  Use: prevents closing of your stent. Side effects: Bleeding or bruising more easily     RANITIDINE 75 PO  Notes to patient:  Use: Treat stomach ulcer and GI reflux  Side Effects: Nausea, constipation, diarrhea, dizziness     rosuvastatin 20 MG tablet  Commonly known as:  CRESTOR  TAKE ONE TABLET BY MOUTH ONCE DAILY  Notes to patient:  Uses: to lower cholesterol and triglycerides, to slow the progress of heart disease  Side effects: headache, belly pain, upset stomach, joint pain, weakness            Discharge recommendations given to patient. Follow Up. Cardiology in 1 week   Disposition. home  Activity. No heavy lifting      Spent 34 minutes in discharge process. Signed:   Suzanne Edwards PA-C     7/16/2019 11:45 AM

## 2019-07-16 NOTE — PROGRESS NOTES
4 Eyes Skin Assessment     The patient is being assess for  Transfer to New Unit    I agree that 2 RN's have performed a thorough Head to Toe Skin Assessment on the patient. ALL assessment sites listed below have been assessed. Areas assessed by both nurses: all  [x]   Head, Face, and Ears   [x]   Shoulders, Back, and Chest  [x]   Arms, Elbows, and Hands   [x]   Coccyx, Sacrum, and IschIum2[x]   Legs, Feet, and Heels        Does the Patient have Skin Breakdown?   No         Kofi Prevention initiated:  No   Wound Care Orders initiated:  No      [facility] nurse consulted for Pressure Injury (Stage 3,4, Unstageable, DTI, NWPT, and Complex wounds), New and Established Ostomies:  No      Nurse 1 eSignature: Electronically signed by Tsosie Cockayne, RN on 7/14/19 at 11:53 AM    **SHARE this note so that the co-signing nurse is able to place an eSignature**    Nurse 2 eSignature: Electronically signed by Armond Fisher RN on 7/14/19 at 11:55 AM
Assisted patient with set-up of home []CPAP / [x]Bi-PAP unit without Oxygen bleed-in. [employer] contacted for equipment safety verification.
EKG completed.
MD placed orders for 12 lead EKG stat, and consult to cardiology. Respiratory called and will complete EKG.   Ena Hazel
[facility]    7/14/2019 8:35 AM        Name: Kailey Whiting . Admitted: 7/13/2019  Primary Care Provider: Сергей Lindsay MD (Tel: [phone])      Subjective: Marycarolyn Jason Pt seen urgently this am at request of RN  Pt had walked to the bathroom and developed chest pain 8/10 that radiated down the left arm  Pain improved to level 3 after 15 minutes of rest.  Stat EKG requested. Pt requests SL NTG     States it \"feels the same as it did when I had a blockage\"    Hx CABG 2016  Also stents placed 2013 and 2017  Compliant with effient and all meds but he continues to smoke daily ( 1/3 pack day )     Pt retires Next Friday July 19 th     Reviewed interval ancillary notes    Current Medications    losartan (COZAAR) tablet 25 mg Daily   aspirin tablet 325 mg Daily   albuterol sulfate  (90 Base) MCG/ACT inhaler 2 puff Q6H PRN   metoprolol tartrate (LOPRESSOR) tablet 50 mg BID   prasugrel (EFFIENT) tablet 10 mg Daily   rosuvastatin (CRESTOR) tablet 20 mg Daily   sodium chloride flush 0.9 % injection 10 mL 2 times per day   sodium chloride flush 0.9 % injection 10 mL PRN   ondansetron (ZOFRAN) injection 4 mg Q6H PRN   enoxaparin (LOVENOX) injection 120 mg BID   pneumococcal 13-valent conjugate (PREVNAR) injection 0.5 mL Prior to discharge       Objective:  /67   Pulse 55   Temp 98.2 °F (36.8 °C) (Temporal)   Resp 20   Ht 5' 7\" (1.702 m)   Wt 272 lb 4.8 oz (123.5 kg)   SpO2 95%   BMI 42.65 kg/m²     Intake/Output Summary (Last 24 hours) at 7/14/2019 0835  Last data filed at 7/14/2019 0047  Gross per 24 hour   Intake 10 ml   Output --   Net 10 ml    Wt Readings from Last 3 Encounters:   07/14/19 272 lb 4.8 oz (123.5 kg)   12/26/17 274 lb (124.3 kg)   12/07/17 260 lb (117.9 kg)       General appearance:  Appears comfortable in bed at present time.   Skin dry, color natural .  He is obese   Eyes: Sclera
Pt to cath lab via bed on telemetry with cath lab staff
VSS. AM labs WNL. Right groin puncture site stable. No complaints of pain overnight.   Should d/c later today
S1S2, no murmur, rub or gallop  · Palpation:  Nl PMI  · JVP:  normal  · Extremities: No Edema  Abdomen:  · Soft, non-tender  · Normal bowel sounds  Extremities:  ·  No Cyanosis or Clubbing  Neurological/Psychiatric:  · Oriented to time, place, and person  · Non-anxious  Skin Warm and dry    Assessment:    Patient Active Hospital Problem List:   [facility] (1/3/2013)    Assessment: s/p PCI of marginal with DIANA    Plan: same meds,go back to losartan   Hyperlipemia (1/9/2013)    Assessment: lipid profile in 2 months and consider PCSK9 if not at goal     Essential hypertension ()        Coronary artery disease involving native coronary artery of native heart with unstable angina pectoris ([facility] ()    Assessment: resolved unstable angina with treatment          Plan:  1. F/up in 1-3 weeks,off work this week,retiring at end of week  2.  Will need sl nitro  3.   4. I have spent  35  minutes of face to face time with the patient with more than 50%  spent  counseling and coordinating care for Mr Daryl Guan spoke with Dr Ruby Sow

## 2019-07-16 NOTE — PLAN OF CARE
Educated patient and/or family on the importance of attending Cardiac Rehab post procedure. Educational flyer provided.
Shelby Sepulveda RN  Outcome: Ongoing  Patient has denied any episodes of angina this shift. Will continue to monitor      Problem: Tissue Perfusion - Cardiopulmonary, Altered:  Goal: Ability to maintain arterial blood gas levels within normal range will improve to within specified parameters  Description  Ability to maintain arterial blood gas levels within normal range will improve to within specified parameters  Outcome: Ongoing    No blood gases ordered this shift      Problem: Tissue Perfusion - Cardiopulmonary, Altered:  Goal: Ability to maintain normal pulse oximetry readings will improve to within specified parameters  Description  Ability to maintain normal pulse oximetry readings will improve to within specified parameters  Outcome: Ongoing   Patient is able to maintain pulse ox readings > 92% on room air. Problem: Tissue Perfusion - Peripheral, Altered:  Goal: Absence of hematoma at arterial access site  Description  Absence of hematoma at arterial access site  7/16/2019 0025 by Haroon Bass RN  Outcome: Ongoing  No hematoma noted at right groin puncture site. Will continue to monitor      Problem: Tissue Perfusion - Peripheral, Altered:  Goal: Absence of signs or symptoms of impaired coagulation  Description  Absence of signs or symptoms of impaired coagulation  Outcome: Ongoing    No s/s of bleeding or impaired coagulation noted. Will continue to monitor      Problem: Tissue Perfusion - Peripheral, Altered:  Goal: Circulatory function of lower extremities is within specified parameters  Description  Circulatory function of lower extremities is within specified parameters  Outcome: Ongoing   Peripheral pulses are easily palpable. Will continue to monitor      Problem: Tissue Perfusion - Peripheral, Altered:  Goal: Strength of peripheral pulses will increase  Description  Strength of peripheral pulses will increase  Outcome: Ongoing   Peripheral pulses are easily palpable.  Will continue to

## 2019-08-06 ENCOUNTER — OFFICE VISIT (OUTPATIENT)
Dept: CARDIOLOGY CLINIC | Age: 64
End: 2019-08-06
Payer: MEDICARE

## 2019-08-06 VITALS
DIASTOLIC BLOOD PRESSURE: 70 MMHG | OXYGEN SATURATION: 94 % | WEIGHT: 272 LBS | BODY MASS INDEX: 42.69 KG/M2 | HEIGHT: 67 IN | SYSTOLIC BLOOD PRESSURE: 130 MMHG | HEART RATE: 94 BPM

## 2019-08-06 DIAGNOSIS — E78.2 MIXED HYPERLIPIDEMIA: Primary | ICD-10-CM

## 2019-08-06 DIAGNOSIS — I10 ESSENTIAL HYPERTENSION: ICD-10-CM

## 2019-08-06 DIAGNOSIS — I25.10 CORONARY ARTERY DISEASE INVOLVING NATIVE CORONARY ARTERY OF NATIVE HEART WITHOUT ANGINA PECTORIS: ICD-10-CM

## 2019-08-06 PROCEDURE — G8598 ASA/ANTIPLAT THER USED: HCPCS | Performed by: NURSE PRACTITIONER

## 2019-08-06 PROCEDURE — 1111F DSCHRG MED/CURRENT MED MERGE: CPT | Performed by: NURSE PRACTITIONER

## 2019-08-06 PROCEDURE — 3017F COLORECTAL CA SCREEN DOC REV: CPT | Performed by: NURSE PRACTITIONER

## 2019-08-06 PROCEDURE — G8417 CALC BMI ABV UP PARAM F/U: HCPCS | Performed by: NURSE PRACTITIONER

## 2019-08-06 PROCEDURE — G8427 DOCREV CUR MEDS BY ELIG CLIN: HCPCS | Performed by: NURSE PRACTITIONER

## 2019-08-06 PROCEDURE — 99214 OFFICE O/P EST MOD 30 MIN: CPT | Performed by: NURSE PRACTITIONER

## 2019-08-06 PROCEDURE — 1123F ACP DISCUSS/DSCN MKR DOCD: CPT | Performed by: NURSE PRACTITIONER

## 2019-08-06 PROCEDURE — 4004F PT TOBACCO SCREEN RCVD TLK: CPT | Performed by: NURSE PRACTITIONER

## 2019-08-06 PROCEDURE — 4040F PNEUMOC VAC/ADMIN/RCVD: CPT | Performed by: NURSE PRACTITIONER

## 2019-08-06 RX ORDER — ROSUVASTATIN CALCIUM 40 MG/1
40 TABLET, COATED ORAL DAILY
Qty: 90 TABLET | Refills: 3 | Status: SHIPPED | OUTPATIENT
Start: 2019-08-06 | End: 2021-01-21 | Stop reason: SDUPTHER

## 2019-08-06 NOTE — LETTER
 aspirin 81 MG tablet Take 4 tablets by mouth daily 30 tablet 11    albuterol sulfate  (90 BASE) MCG/ACT inhaler Inhale 2 puffs into the lungs every 6 hours as needed for Wheezing      Ranitidine HCl (RANITIDINE 75 PO) Take 1 tablet by mouth as needed       losartan (COZAAR) 25 MG tablet Take 25 mg by mouth daily. No current facility-administered medications for this visit. REVIEW OF SYSTEMS:   CONSTITUTIONAL: No major weight gain or loss, fatigue, weakness, night sweats or fever. There's been no change in energy level, sleep pattern, or activity level. HEENT: No new vision difficulties or ringing in the ears. RESPIRATORY: No new SOB, PND, orthopnea or cough. CARDIOVASCULAR: See HPI  GI: No nausea, vomiting, diarrhea, constipation, abdominal pain or changes in bowel habits. : No urinary frequency, urgency, incontinence hematuria or dysuria. SKIN: No cyanosis or skin lesions. MUSCULOSKELETAL: No new muscle or joint pain. NEUROLOGICAL: No syncope or TIA-like symptoms. PSYCHIATRIC: No anxiety, pain, insomnia or depression    Objective:   PHYSICAL EXAM:        VITALS:  /70 (Site: Left Upper Arm, Position: Sitting, Cuff Size: Large Adult)   Pulse 94   Ht 5' 7\" (1.702 m)   Wt 272 lb (123.4 kg)   SpO2 94%   BMI 42.60 kg/m²      CONSTITUTIONAL: Cooperative, no apparent distress, and appears well nourished / developed  NEUROLOGIC:  Awake and orientated to person, place and time. PSYCH: Calm affect. SKIN: Warm and dry. HEENT: Sclera non-icteric, normocephalic, neck supple, no elevation of JVP, normal carotid pulses with no bruits and thyroid normal size. LUNGS:  No increased work of breathing and clear to auscultation, no crackles or wheezing. CARDIOVASCULAR:  Regular rate and rhythm with no murmurs, gallops, rubs, or abnormal heart sounds, normal PMI. The apical impulses not displaced.       Heart tones are crisp and normal   Cervical veins are not engorged

## 2019-08-06 NOTE — PROGRESS NOTES
[facility]     Outpatient Follow Up Note    CHIEF COMPLAINT / HPI: Hospital Follow Up   secondary to coronary artery disease/ HTN/ Hyperlipidemia     Hospital record has been reviewed  Hospital Course progressed as follows: He was admitted with L sided chest pain that radiated down L arm, and was getting worse. He came to ER then cath and PCI,  NO issues  Currently the patient denies significant chest pain. There is no associated ADRIAN. The patient is not experiencing palpitations. These symptoms are improving over the last few weeks. With regard to medication therapy the patient has been compliant with prescribed regimen. They have tolerated therapy to date. He is now retired and trying to walk more    Past Medical History:   Diagnosis Date    CAD (coronary artery disease)     Hyperlipidemia     Hypertension      Social History:    Social History     Tobacco Use   Smoking Status Current Every Day Smoker    Packs/day: 0.75    Types: Cigarettes    Last attempt to quit: 2000    Years since quittin.6   Smokeless Tobacco Never Used     Current Medications:  Current Outpatient Medications   Medication Sig Dispense Refill    prasugrel (EFFIENT) 10 MG TABS TAKE 1 TABLET BY MOUTH DAILY 90 tablet 3    rosuvastatin (CRESTOR) 20 MG tablet TAKE ONE TABLET BY MOUTH ONCE DAILY 30 tablet 1    metoprolol tartrate (LOPRESSOR) 50 MG tablet TAKE ONE TABLET BY MOUTH TWICE DAILY 60 tablet 6    nitroGLYCERIN (NITROSTAT) 0.4 MG SL tablet Place 0.4 mg under the tongue      aspirin 81 MG tablet Take 4 tablets by mouth daily 30 tablet 11    albuterol sulfate  (90 BASE) MCG/ACT inhaler Inhale 2 puffs into the lungs every 6 hours as needed for Wheezing      Ranitidine HCl (RANITIDINE 75 PO) Take 1 tablet by mouth as needed       losartan (COZAAR) 25 MG tablet Take 25 mg by mouth daily. No current facility-administered medications for this visit.       REVIEW OF SYSTEMS: 07/13/2019    ALKPHOS 102 07/13/2019    BILITOT 0.3 07/13/2019     Lab Results   Component Value Date    CREATININE 0.9 07/16/2019    BUN 13 07/16/2019     07/16/2019    K 4.5 07/16/2019     07/16/2019    CO2 26 07/16/2019     No results found for: TSH, D9DMXAM, V5PFGSU, THYROIDAB  Lab Results   Component Value Date    WBC 8.1 07/13/2019    HGB 15.8 07/13/2019    HCT 47.3 07/13/2019    MCV 91.2 07/13/2019     07/13/2019     No components found for: CHLPL  Lab Results   Component Value Date    TRIG 187 (H) 07/16/2019    TRIG 113 10/15/2016    TRIG 167 (H) 07/09/2016     Lab Results   Component Value Date    HDL 26 (L) 07/16/2019    HDL 36 (L) 10/15/2016    HDL 37 (L) 07/09/2016     Lab Results   Component Value Date    LDLCALC 92 07/16/2019    LDLCALC 72 10/15/2016    LDLCALC 158 (H) 07/09/2016     Lab Results   Component Value Date    LABVLDL 37 07/16/2019    LABVLDL 23 10/15/2016    LABVLDL 33 07/09/2016     Radiology Review:  Pertinent images / reports were reviewed as a part of this visit and reveals the following:      Invasive procedures and treatments. Left heart cath by Dr. Sheryl Reynolds on July 15     Cath with COLEMAN to LAD patent,SVG to either intermediate or diag is widely patent     RCA widely patent with previously placed stent. 100% LAD, OM1 with marked progression of disease with 95% OM1  EF 65%     Discussed need for smoking cessation. Likely crestor not effectively controlling lipids     PCI with 3 x18 [address] stent with excellent angiographic result    Cardiac Angiogram/ PCI of RCA: 12/7/17  Cath with patent LIMA to LAD,patent SVG to intermediate  95% RCA at proximal stent edge. Normal EF,LVEDP 20  Trop . 11 c/w nonQ MI'     PCI with 3.5 x 12 Alpine DIANA in mid RCA with stent beyond that dilated with 3.5 balloon  Excellent result     LHC 12/15   MVD->CABG   MPI 12/15 NL Normal perfusion   TTE 1/16 60%             Assessment:   Coronary artery disease  7/19-cath and PCI DIANA mid RCA  12/7/17:

## 2020-01-27 NOTE — PROGRESS NOTES
apical impulses not displaced  · Heart tones are crisp and normal  · Cervical veins are not engorged  · The carotid upstroke is normal in amplitude and contour without delay or bruit  · Peripheral pulses are symmetrical and full  · There is no clubbing, cyanosis of the extremities. · No edema  · Femoral Arteries: 2+ and equal  · Pedal Pulses: 2+ and equal   Abdomen:  · No masses or tenderness  · Liver/Spleen: No Abnormalities Noted  Neurological/Psychiatric:  · Alert and oriented in all spheres  · Moves all extremities well  · Exhibits normal gait balance and coordination  · No abnormalities of mood, affect, memory, mentation, or behavior are noted      Assessment:    1. Coronary artery disease involving native coronary artery of native heart without angina pectoris   ~Patient denies any anginal symptoms  7/19-cath and PCI DIANA mid RCA  12/7/17: Cardiac angiogram/ PCI of RCA- DIANA     2. Essential hypertension-controlled  Blood pressure (!) 140/60, pulse 84, height 5' 6\" (1.676 m), weight 293 lb (132.9 kg), SpO2 96 %. 3. Mixed hyperlipidemia -  On Crestor 20 mg  7/19  TC-155, Trig-187, HDL-26, LDL-92     Achieved smoking cessation! Plan:  Joie Lunsford has a stable cardiac status. Cardiac test and lab results personally reviewed by me during this office visit and discussed. No med changes. Continue risk factor modifications, encouraged healthy eating and weight loss. Call for any change in symptoms. Return for regular follow up in 6 months with labs prior. I appreciate the opportunity of cooperating in the care of this individual.    Miracle Crews. Tony Raymundo M.D., [facility]    Patient's problem list, medications, allergies, past medical, surgical, social and family histories were reviewed and updated as appropriate. Scribe's attestation: This note was scribed in the presence of Dr. Tony Raymundo MD, by Kel De La Torre RN.     The scribe's documentation has been prepared under my direction and personally reviewed by me

## 2020-02-11 ENCOUNTER — OFFICE VISIT (OUTPATIENT)
Dept: CARDIOLOGY CLINIC | Age: 65
End: 2020-02-11
Payer: MEDICARE

## 2020-02-11 VITALS
HEIGHT: 66 IN | WEIGHT: 293 LBS | DIASTOLIC BLOOD PRESSURE: 60 MMHG | SYSTOLIC BLOOD PRESSURE: 140 MMHG | HEART RATE: 84 BPM | BODY MASS INDEX: 47.09 KG/M2 | OXYGEN SATURATION: 96 %

## 2020-02-11 PROCEDURE — G8417 CALC BMI ABV UP PARAM F/U: HCPCS | Performed by: INTERNAL MEDICINE

## 2020-02-11 PROCEDURE — G8427 DOCREV CUR MEDS BY ELIG CLIN: HCPCS | Performed by: INTERNAL MEDICINE

## 2020-02-11 PROCEDURE — 1123F ACP DISCUSS/DSCN MKR DOCD: CPT | Performed by: INTERNAL MEDICINE

## 2020-02-11 PROCEDURE — G8484 FLU IMMUNIZE NO ADMIN: HCPCS | Performed by: INTERNAL MEDICINE

## 2020-02-11 PROCEDURE — 4040F PNEUMOC VAC/ADMIN/RCVD: CPT | Performed by: INTERNAL MEDICINE

## 2020-02-11 PROCEDURE — 4004F PT TOBACCO SCREEN RCVD TLK: CPT | Performed by: INTERNAL MEDICINE

## 2020-02-11 PROCEDURE — 3017F COLORECTAL CA SCREEN DOC REV: CPT | Performed by: INTERNAL MEDICINE

## 2020-02-11 PROCEDURE — 99214 OFFICE O/P EST MOD 30 MIN: CPT | Performed by: INTERNAL MEDICINE

## 2020-08-19 NOTE — PROGRESS NOTES
[facility]   Cardiac Follow Up    Referring Provider:  Gabriela Burden MD     Chief Complaint   Patient presents with    Shortness of Breath     all the time     Hyperlipidemia     no other complaints     Hypertension    Follow-up     6 mo         History of Present Illness:  Mitchel Turner is a 76 y.o. male with a history of coronary artery disease/ HTN/ Hyperlipidemia. Today he states he has been feeling well, enjoying USP. He has stopped consuming caffeine. He has quit smoking, he quit at the time of his stent placement. Denies exertional chest pain, ADRIAN/PND, palpitations, light-headedness, edema. He has not yet had his labs drawn. He has some shortness of breath, he attributes this to weight. Past Medical History:   has a past medical history of CAD (coronary artery disease), Hyperlipidemia, and Hypertension. Surgical History:   has a past surgical history that includes partial nephrectomy; Coronary angioplasty with stent (1/4/13); Coronary artery bypass graft; joint replacement; and joint replacement (Right, 2014). Social History:   reports that he has been smoking cigarettes. He has been smoking about 0.75 packs per day. He has never used smokeless tobacco. He reports current alcohol use. He reports that he does not use drugs. Family History:  family history includes Heart Disease in his brother, mother, and sister; Stroke in his father. Home Medications:  Prior to Admission medications    Medication Sig Start Date End Date Taking?  Authorizing Provider   rosuvastatin (CRESTOR) 40 MG tablet Take 1 tablet by mouth daily 8/6/19  Yes Juno Bacon, APRN - CNP   prasugrel (EFFIENT) 10 MG TABS TAKE 1 TABLET BY MOUTH DAILY 3/6/19  Yes Jailene Hicks MD   metoprolol tartrate (LOPRESSOR) 50 MG tablet TAKE ONE TABLET BY MOUTH TWICE DAILY 5/24/18  Yes Jailene Hicks MD   nitroGLYCERIN (NITROSTAT) 0.4 MG SL tablet Place 0.4 mg under the tongue   Yes Historical Provider, MD   aspirin 81 MG tablet Take 4 tablets by mouth daily 2/17/17  Yes Alexandre Marcial MD   albuterol sulfate  (90 BASE) MCG/ACT inhaler Inhale 2 puffs into the lungs every 6 hours as needed for Wheezing   Yes Historical Provider, MD   losartan (COZAAR) 25 MG tablet Take 25 mg by mouth daily. Yes Historical Provider, MD        Allergies:  Lisinopril     Review of Systems:   · Constitutional: there has been no unanticipated weight loss. There's been no change in energy level, sleep pattern, or activity level. · Eyes: No visual changes or diplopia. No scleral icterus. · ENT: No Headaches, hearing loss or vertigo. No mouth sores or sore throat. · Cardiovascular: Reviewed in HPI  · Respiratory: No cough or wheezing, no sputum production. No hematemesis. · Gastrointestinal: No abdominal pain, appetite loss, blood in stools. No change in bowel or bladder habits. · Genitourinary: No dysuria, trouble voiding, or hematuria. · Musculoskeletal:  No gait disturbance, weakness or joint complaints. · Integumentary: No rash or pruritis. · Neurological: No headache, diplopia, change in muscle strength, numbness or tingling. No change in gait, balance, coordination, mood, affect, memory, mentation, behavior. · Psychiatric: No anxiety, no depression. · Endocrine: No malaise, fatigue or temperature intolerance. No excessive thirst, fluid intake, or urination. No tremor. · Hematologic/Lymphatic: No abnormal bruising or bleeding, blood clots or swollen lymph nodes. · Allergic/Immunologic: No nasal congestion or hives.     Physical Examination:    Vitals:    08/24/20 0959   BP: 130/68   Pulse: 70   SpO2: 96%        Wt Readings from Last 1 Encounters:   08/24/20 299 lb (135.6 kg)       Constitutional and General Appearance:NAD  Skin:good turgor,intact without lesions  HEENT: EOMI ,normal  Neck:no JVD     Respiratory:  · Normal excursion and expansion without use of accessory muscles  · Resp RN.    The scribe's documentation has been prepared under my direction and personally reviewed by me in its entirety. I confirm that the note above accurately reflects all work, treatment, procedures, and medical decision making performed by me.

## 2020-08-24 ENCOUNTER — HOSPITAL ENCOUNTER (OUTPATIENT)
Age: 65
Discharge: HOME | End: 2020-08-24
Payer: MEDICARE

## 2020-08-24 ENCOUNTER — OFFICE VISIT (OUTPATIENT)
Dept: CARDIOLOGY CLINIC | Age: 65
End: 2020-08-24
Payer: MEDICARE

## 2020-08-24 VITALS
DIASTOLIC BLOOD PRESSURE: 68 MMHG | WEIGHT: 299 LBS | OXYGEN SATURATION: 96 % | BODY MASS INDEX: 46.93 KG/M2 | HEIGHT: 67 IN | HEART RATE: 70 BPM | SYSTOLIC BLOOD PRESSURE: 130 MMHG

## 2020-08-24 LAB
ALBUMIN SERPL-MCNC: 4.1 G/DL (ref 3.4–5)
ALP BLD-CCNC: 101 U/L (ref 40–129)
ALT SERPL-CCNC: 18 U/L (ref 10–40)
ANION GAP SERPL CALCULATED.3IONS-SCNC: 9 MMOL/L (ref 3–16)
AST SERPL-CCNC: 18 U/L (ref 15–37)
BASOPHILS ABSOLUTE: 0.1 K/UL (ref 0–0.2)
BASOPHILS RELATIVE PERCENT: 0.7 %
BILIRUB SERPL-MCNC: 0.3 MG/DL (ref 0–1)
BILIRUBIN DIRECT: <0.2 MG/DL (ref 0–0.3)
BILIRUBIN, INDIRECT: NORMAL MG/DL (ref 0–1)
BUN BLDV-MCNC: 12 MG/DL (ref 7–20)
CALCIUM SERPL-MCNC: 9.6 MG/DL (ref 8.3–10.6)
CHLORIDE BLD-SCNC: 106 MMOL/L (ref 99–110)
CHOLESTEROL, TOTAL: 132 MG/DL (ref 0–199)
CO2: 27 MMOL/L (ref 21–32)
CREAT SERPL-MCNC: 1.1 MG/DL (ref 0.8–1.3)
EOSINOPHILS ABSOLUTE: 0.4 K/UL (ref 0–0.6)
EOSINOPHILS RELATIVE PERCENT: 4.7 %
GFR AFRICAN AMERICAN: >60
GFR NON-AFRICAN AMERICAN: >60
GLUCOSE BLD-MCNC: 107 MG/DL (ref 70–99)
HCT VFR BLD CALC: 41.8 % (ref 40.5–52.5)
HDLC SERPL-MCNC: 37 MG/DL (ref 40–60)
HEMOGLOBIN: 13.6 G/DL (ref 13.5–17.5)
LDL CHOLESTEROL CALCULATED: 65 MG/DL
LYMPHOCYTES ABSOLUTE: 1.9 K/UL (ref 1–5.1)
LYMPHOCYTES RELATIVE PERCENT: 23.5 %
MCH RBC QN AUTO: 29 PG (ref 26–34)
MCHC RBC AUTO-ENTMCNC: 32.5 G/DL (ref 31–36)
MCV RBC AUTO: 89.2 FL (ref 80–100)
MONOCYTES ABSOLUTE: 0.7 K/UL (ref 0–1.3)
MONOCYTES RELATIVE PERCENT: 8.8 %
NEUTROPHILS ABSOLUTE: 5.1 K/UL (ref 1.7–7.7)
NEUTROPHILS RELATIVE PERCENT: 62.3 %
PDW BLD-RTO: 15.3 % (ref 12.4–15.4)
PHOSPHORUS: 3.1 MG/DL (ref 2.5–4.9)
PLATELET # BLD: 264 K/UL (ref 135–450)
PMV BLD AUTO: 6.9 FL (ref 5–10.5)
POTASSIUM SERPL-SCNC: 5.2 MMOL/L (ref 3.5–5.1)
RBC # BLD: 4.68 M/UL (ref 4.2–5.9)
SODIUM BLD-SCNC: 142 MMOL/L (ref 136–145)
TOTAL PROTEIN: 7.2 G/DL (ref 6.4–8.2)
TRIGL SERPL-MCNC: 151 MG/DL (ref 0–150)
VLDLC SERPL CALC-MCNC: 30 MG/DL
WBC # BLD: 8.2 K/UL (ref 4–11)

## 2020-08-24 PROCEDURE — 4040F PNEUMOC VAC/ADMIN/RCVD: CPT | Performed by: INTERNAL MEDICINE

## 2020-08-24 PROCEDURE — 80061 LIPID PANEL: CPT

## 2020-08-24 PROCEDURE — 85025 COMPLETE CBC W/AUTO DIFF WBC: CPT

## 2020-08-24 PROCEDURE — 3017F COLORECTAL CA SCREEN DOC REV: CPT | Performed by: INTERNAL MEDICINE

## 2020-08-24 PROCEDURE — G8427 DOCREV CUR MEDS BY ELIG CLIN: HCPCS | Performed by: INTERNAL MEDICINE

## 2020-08-24 PROCEDURE — 80076 HEPATIC FUNCTION PANEL: CPT

## 2020-08-24 PROCEDURE — 1123F ACP DISCUSS/DSCN MKR DOCD: CPT | Performed by: INTERNAL MEDICINE

## 2020-08-24 PROCEDURE — G8417 CALC BMI ABV UP PARAM F/U: HCPCS | Performed by: INTERNAL MEDICINE

## 2020-08-24 PROCEDURE — 4004F PT TOBACCO SCREEN RCVD TLK: CPT | Performed by: INTERNAL MEDICINE

## 2020-08-24 PROCEDURE — 99214 OFFICE O/P EST MOD 30 MIN: CPT | Performed by: INTERNAL MEDICINE

## 2020-08-24 PROCEDURE — 80069 RENAL FUNCTION PANEL: CPT

## 2020-08-24 PROCEDURE — 36415 COLL VENOUS BLD VENIPUNCTURE: CPT

## 2021-01-04 RX ORDER — PRASUGREL 10 MG/1
10 TABLET, FILM COATED ORAL DAILY
Qty: 90 TABLET | Refills: 3 | Status: SHIPPED | OUTPATIENT
Start: 2021-01-04 | End: 2021-10-13

## 2021-01-04 NOTE — TELEPHONE ENCOUNTER
Received refill request for Effient from [facility] DR KIMBERLY GUZMAN.      Last OV: 08/24/2020 w/ LES    Last Labs: 08/24/2020 Hepatic, Renal , CBC , Lipid    Last Refill: 03/06/2019 #90 w/ 3 refills    Next Appointment: on recall list for appt on 02/20/2021 w/ LES

## 2021-01-04 NOTE — TELEPHONE ENCOUNTER
Medication Refill    Medication needing refilled: prasugrel (EFFIENT) 10 MG TABS     Dosage of the medication: 10 mg    How are you taking this medication (QD, BID, TID, QID, PRN): 1 tab daily    30 or 90 day supply called in: 80    Which Pharmacy are we sending the medication to?: [id number] [address], [address] [place] [address],[address] [address]   Phone:  [phone]  Fax:  [phone]

## 2021-01-21 ENCOUNTER — TELEPHONE (OUTPATIENT)
Dept: CARDIOLOGY CLINIC | Age: 66
End: 2021-01-21

## 2021-01-21 RX ORDER — METOPROLOL TARTRATE 50 MG/1
TABLET, FILM COATED ORAL
Qty: 180 TABLET | Refills: 1 | Status: SHIPPED | OUTPATIENT
Start: 2021-01-21 | End: 2021-07-20

## 2021-01-21 RX ORDER — ROSUVASTATIN CALCIUM 40 MG/1
40 TABLET, COATED ORAL DAILY
Qty: 90 TABLET | Refills: 3 | Status: SHIPPED | OUTPATIENT
Start: 2021-01-21

## 2021-01-21 RX ORDER — METOPROLOL TARTRATE 50 MG/1
TABLET, FILM COATED ORAL
Qty: 180 TABLET | Refills: 1 | Status: SHIPPED | OUTPATIENT
Start: 2021-01-21 | End: 2021-01-21 | Stop reason: SDUPTHER

## 2021-01-21 RX ORDER — LOSARTAN POTASSIUM 25 MG/1
25 TABLET ORAL DAILY
Qty: 90 TABLET | Refills: 1 | Status: SHIPPED | OUTPATIENT
Start: 2021-01-21 | End: 2021-07-20

## 2021-01-21 RX ORDER — LOSARTAN POTASSIUM 25 MG/1
25 TABLET ORAL DAILY
Qty: 90 TABLET | Refills: 1 | Status: SHIPPED | OUTPATIENT
Start: 2021-01-21 | End: 2021-01-21 | Stop reason: SDUPTHER

## 2021-01-21 RX ORDER — ROSUVASTATIN CALCIUM 40 MG/1
40 TABLET, COATED ORAL DAILY
Qty: 90 TABLET | Refills: 3 | Status: SHIPPED | OUTPATIENT
Start: 2021-01-21 | End: 2021-01-21 | Stop reason: SDUPTHER

## 2021-01-21 NOTE — TELEPHONE ENCOUNTER
Medication Refill    Medication needing refilled:  Metoprolol, losartan , crestor     Dosage of the medication:    How are you taking this medication (QD, BID, TID, QID, PRN):    30 or 90 day supply called in:    When will you run out of your medication:  Few pills left     Which Pharmacy are we sending the medication to?:  [facility] [address] [facility]

## 2021-07-20 RX ORDER — METOPROLOL TARTRATE 50 MG/1
TABLET, FILM COATED ORAL
Qty: 180 TABLET | Refills: 1 | Status: SHIPPED | OUTPATIENT
Start: 2021-07-20 | End: 2022-02-10 | Stop reason: SDUPTHER

## 2021-07-20 RX ORDER — LOSARTAN POTASSIUM 25 MG/1
25 TABLET ORAL DAILY
Qty: 90 TABLET | Refills: 1 | Status: SHIPPED | OUTPATIENT
Start: 2021-07-20 | End: 2022-02-02 | Stop reason: SDUPTHER

## 2022-02-02 RX ORDER — LOSARTAN POTASSIUM 25 MG/1
25 TABLET ORAL DAILY
Qty: 90 TABLET | Refills: 1 | Status: SHIPPED | OUTPATIENT
Start: 2022-02-02 | End: 2022-02-10 | Stop reason: SDUPTHER

## 2022-02-02 NOTE — TELEPHONE ENCOUNTER
Medication Refill    Medication needing refilled:    losartan (COZAAR) 25 MG tablet [[id number]]      Dosage of the medication:  25 mg    How are you taking this medication (QD, BID, TID, QID, PRN):  1 tablet by mouth daily    30 or 90 day supply called in:  90    When will you run out of your medication:  Patient is out    Which Pharmacy are we sending the medication to?:  [address], [address] [place] [address] [phone] - [phone]   [address] [place] [address], [address] [place] [phone]   Phone:  [phone]  Fax:  [phone]        Medication Refill    Medication needing refilled:    metoprolol tartrate (LOPRESSOR) 50 MG tablet [[id number]]     Dosage of the medication:  50 mg     How are you taking this medication (QD, BID, TID, QID, PRN):  1 tablet by mouth daily    30 or 90 day supply called in:  90    When will you run out of your medication:  Patient is out    Which Pharmacy are we sending the medication to?:  [address], [address] [place] [address] [phone] [facility] [phone]   [address] [place] [address], [address] [facility],[id number]   Phone:  [phone]  Fax:  [phone]

## 2022-02-09 NOTE — TELEPHONE ENCOUNTER
Last OV: 8-24-20  Last labs: 5-20-21 cmp  Appt scheduled : 2-18-22  Last refill:losartan 2-2-22  Lopressor 7-20-21  prasugrel 10-13-21       Losartan is ready for p/u from pharmacy need Rx for lopressor and effient,RX pending.
Medication Refill    Medication needing refilled:  Praugrel (efficient)       Dosage of the medication:  10 mg tabs    How are you taking this medication (QD, BID, TID, QID, PRN):  1 tablet by mouth every day    30 or 90 day supply called in:  90    When will you run out of your medication:  3 days    Which Pharmacy are we sending the medication to?:    [id number]  [address]  Fax: [phone]        Medication Refill    Medication needing refilled:  Losartan (cozaar)    Dosage of the medication:  25 mg    How are you taking this medication (QD, BID, TID, QID, PRN):  1 tablet by mouth daily    30 or 90 day supply called in:  90    When will you run out of your medication:  3 days    Which Pharmacy are we sending the medication to?:    [facility] [address]  Fax: [phone]        Medication Refill      Medication needing refilled:  metroprolol tartrate (Lopressor)    Dosage of the medication:  50 mg    How are you taking this medication (QD, BID, TID, QID, PRN):  1 tablet by mouth twice daily    30 or 90 day supply called in:  90      When will you run out of your medication:  3 days    Which Pharmacy are we sending the medication to?:    [facility] [address], [address] [place] [address]  Fax: [phone]
"You can access the [facility], offered by [facility], by registering with the following website: [email or web address]"

## 2022-02-10 RX ORDER — PRASUGREL 10 MG/1
TABLET, FILM COATED ORAL
Qty: 90 TABLET | Refills: 1 | Status: CANCELLED | OUTPATIENT
Start: 2022-02-10

## 2022-02-10 RX ORDER — LOSARTAN POTASSIUM 25 MG/1
25 TABLET ORAL DAILY
Qty: 90 TABLET | Refills: 1 | Status: SHIPPED | OUTPATIENT
Start: 2022-02-10 | End: 2022-08-10

## 2022-02-10 RX ORDER — PRASUGREL 10 MG/1
TABLET, FILM COATED ORAL
Qty: 90 TABLET | Refills: 0 | Status: SHIPPED | OUTPATIENT
Start: 2022-02-10 | End: 2022-02-18

## 2022-02-10 RX ORDER — PRASUGREL 10 MG/1
TABLET, FILM COATED ORAL
Qty: 90 TABLET | Refills: 1 | Status: SHIPPED | OUTPATIENT
Start: 2022-02-10 | End: 2022-02-18

## 2022-02-10 RX ORDER — LOSARTAN POTASSIUM 25 MG/1
25 TABLET ORAL DAILY
Qty: 90 TABLET | Refills: 1 | Status: CANCELLED | OUTPATIENT
Start: 2022-02-10

## 2022-02-10 RX ORDER — METOPROLOL TARTRATE 50 MG/1
TABLET, FILM COATED ORAL
Qty: 180 TABLET | Refills: 1 | Status: SHIPPED | OUTPATIENT
Start: 2022-02-10 | End: 2022-02-18

## 2022-02-10 RX ORDER — METOPROLOL TARTRATE 50 MG/1
TABLET, FILM COATED ORAL
Qty: 180 TABLET | Refills: 1 | Status: CANCELLED | OUTPATIENT
Start: 2022-02-10

## 2022-02-11 RX ORDER — METOPROLOL TARTRATE 50 MG/1
TABLET, FILM COATED ORAL
Qty: 180 TABLET | Refills: 1 | Status: SHIPPED | OUTPATIENT
Start: 2022-02-11 | End: 2022-08-10

## 2022-02-11 RX ORDER — PRASUGREL 10 MG/1
TABLET, FILM COATED ORAL
Qty: 90 TABLET | Refills: 3 | Status: SHIPPED | OUTPATIENT
Start: 2022-02-11

## 2022-02-18 ENCOUNTER — OFFICE VISIT (OUTPATIENT)
Dept: CARDIOLOGY CLINIC | Age: 67
End: 2022-02-18
Payer: MEDICARE

## 2022-02-18 VITALS
SYSTOLIC BLOOD PRESSURE: 132 MMHG | BODY MASS INDEX: 46.9 KG/M2 | OXYGEN SATURATION: 98 % | HEIGHT: 67 IN | HEART RATE: 68 BPM | WEIGHT: 298.8 LBS | DIASTOLIC BLOOD PRESSURE: 78 MMHG

## 2022-02-18 DIAGNOSIS — E78.49 OTHER HYPERLIPIDEMIA: Chronic | ICD-10-CM

## 2022-02-18 DIAGNOSIS — I25.10 CORONARY ARTERY DISEASE INVOLVING NATIVE CORONARY ARTERY OF NATIVE HEART WITHOUT ANGINA PECTORIS: ICD-10-CM

## 2022-02-18 DIAGNOSIS — R06.09 DOE (DYSPNEA ON EXERTION): ICD-10-CM

## 2022-02-18 DIAGNOSIS — I25.110 CORONARY ARTERY DISEASE INVOLVING NATIVE CORONARY ARTERY OF NATIVE HEART WITH UNSTABLE ANGINA PECTORIS (HCC): Primary | Chronic | ICD-10-CM

## 2022-02-18 PROCEDURE — 4040F PNEUMOC VAC/ADMIN/RCVD: CPT | Performed by: INTERNAL MEDICINE

## 2022-02-18 PROCEDURE — G8427 DOCREV CUR MEDS BY ELIG CLIN: HCPCS | Performed by: INTERNAL MEDICINE

## 2022-02-18 PROCEDURE — G8484 FLU IMMUNIZE NO ADMIN: HCPCS | Performed by: INTERNAL MEDICINE

## 2022-02-18 PROCEDURE — 3017F COLORECTAL CA SCREEN DOC REV: CPT | Performed by: INTERNAL MEDICINE

## 2022-02-18 PROCEDURE — 1123F ACP DISCUSS/DSCN MKR DOCD: CPT | Performed by: INTERNAL MEDICINE

## 2022-02-18 PROCEDURE — 99214 OFFICE O/P EST MOD 30 MIN: CPT | Performed by: INTERNAL MEDICINE

## 2022-02-18 PROCEDURE — G8417 CALC BMI ABV UP PARAM F/U: HCPCS | Performed by: INTERNAL MEDICINE

## 2022-02-18 PROCEDURE — 93000 ELECTROCARDIOGRAM COMPLETE: CPT | Performed by: INTERNAL MEDICINE

## 2022-02-18 PROCEDURE — 1036F TOBACCO NON-USER: CPT | Performed by: INTERNAL MEDICINE

## 2022-02-18 NOTE — PROGRESS NOTES
[address]   Cardiac Follow Up    Referring Provider:  Susannah Guardado MD     Chief Complaint   Patient presents with    Hypertension    Hyperlipidemia    Coronary Artery Disease    1 Year Follow Up      History of Present Illness:  Riya Oconnor is a 78 y.o. male with a history of coronary artery disease/stents, hypertension, hyperlipidemia. Today, he is here for regular follow up. Overall, he feels well. He denies exertional chest pain, ADRIAN/PND, palpitations, light-headedness, edema. Not very active due to orthopedic issues jeni his knees. Past Medical History:   has a past medical history of CAD (coronary artery disease), Hyperlipidemia, and Hypertension. Surgical History:   has a past surgical history that includes partial nephrectomy; Coronary angioplasty with stent (1/4/13); Coronary artery bypass graft; joint replacement; and joint replacement (Right, 2014). Social History:   reports that he quit smoking about 21 years ago. His smoking use included cigarettes. He smoked 0.75 packs per day. He has never used smokeless tobacco. He reports current alcohol use. He reports that he does not use drugs. Family History:  family history includes Heart Disease in his brother, mother, and sister; Stroke in his father. Home Medications:  Prior to Admission medications    Medication Sig Start Date End Date Taking?  Authorizing Provider   metoprolol tartrate (LOPRESSOR) 50 MG tablet TAKE 1 TABLET BY MOUTH TWICE DAILY 2/11/22  Yes Frederick Stratton MD   prasugrel (EFFIENT) 10 MG TABS TAKE 1 TABLET BY MOUTH EVERY DAY 2/11/22  Yes Frederick Stratton MD   losartan (COZAAR) 25 MG tablet Take 1 tablet by mouth daily 2/10/22  Yes Frederick Stratton MD   rosuvastatin (CRESTOR) 40 MG tablet Take 1 tablet by mouth daily 1/21/21  Yes Frederick Stratton MD   nitroGLYCERIN (NITROSTAT) 0.4 MG SL tablet Place 0.4 mg under the tongue   Yes Historical Provider, MD   aspirin 81 MG tablet Take 4 tablets by mouth daily 2/17/17  Yes Micheal Beltrán MD   albuterol sulfate  (90 BASE) MCG/ACT inhaler Inhale 2 puffs into the lungs every 6 hours as needed for Wheezing   Yes Historical Provider, MD        Allergies:  Lisinopril     Review of Systems:   · Constitutional: there has been no unanticipated weight loss. There's been no change in energy level, sleep pattern, or activity level. · Eyes: No visual changes or diplopia. No scleral icterus. · ENT: No Headaches, hearing loss or vertigo. No mouth sores or sore throat. · Cardiovascular: Reviewed in HPI  · Respiratory: No cough or wheezing, no sputum production. No hematemesis. · Gastrointestinal: No abdominal pain, appetite loss, blood in stools. No change in bowel or bladder habits. · Genitourinary: No dysuria, trouble voiding, or hematuria. · Musculoskeletal:  No gait disturbance, weakness or joint complaints. · Integumentary: No rash or pruritis. · Neurological: No headache, diplopia, change in muscle strength, numbness or tingling. No change in gait, balance, coordination, mood, affect, memory, mentation, behavior. · Psychiatric: No anxiety, no depression. · Endocrine: No malaise, fatigue or temperature intolerance. No excessive thirst, fluid intake, or urination. No tremor. · Hematologic/Lymphatic: No abnormal bruising or bleeding, blood clots or swollen lymph nodes. · Allergic/Immunologic: No nasal congestion or hives.     Physical Examination:    Vitals:    02/18/22 1352   BP: 132/78   Pulse: 68   SpO2: 98%        Wt Readings from Last 1 Encounters:   02/18/22 298 lb 12.8 oz (135.5 kg)       Constitutional and General Appearance:NAD  Skin:good turgor,intact without lesions  HEENT: EOMI ,normal  Neck:no JVD     Respiratory:  · Normal excursion and expansion without use of accessory muscles  · Resp Auscultation: Normal breath sounds without dullness  Cardiovascular:  · The apical impulses not displaced  · Heart tones are crisp and normal  · Cervical veins are not engorged  · The carotid upstroke is normal in amplitude and contour without delay or bruit  · Peripheral pulses are symmetrical and full  · There is no clubbing, cyanosis of the extremities. · No edema  · Femoral Arteries: 2+ and equal  · Pedal Pulses: 2+ and equal   Abdomen:  · No masses or tenderness  · Liver/Spleen: No Abnormalities Noted  Neurological/Psychiatric:  · Alert and oriented in all spheres  · Moves all extremities well  · Exhibits normal gait balance and coordination  · No abnormalities of mood, affect, memory, mentation, or behavior are noted      Assessment:    1. Coronary artery disease: Having some SOB. [facility] 7/2019> PCI DIANA mid RCA  [facility] 12/2017> PCI of RCA- DIANA     2. Essential hypertension: Stable  Blood pressure 132/78, pulse 68, height 5' 7\" (1.702 m), weight 298 lb 12.8 oz (135.5 kg), SpO2 98 %. 3. Mixed hyperlipidemia: Stable on Crestor 20 mg       Achieved smoking cessation! Plan:  Leonila Gonzales has a stable cardiac status. Cardiac test and lab results personally reviewed by me during this office visit and discussed. 1. No med changes  2. CMP, CBC, lipids soon  3. Lexiscan celso to assess coronary status soon  4. Return for regular follow up in 6 months    I appreciate the opportunity of cooperating in the care of this individual.    Frances Saab. Awilda Mcintyre M.D., [facility]    Patient's problem list, medications, allergies, past medical, surgical, social and family histories were reviewed and updated as appropriate. Scribe's attestation: This note was scribed in the presence of Dr. Awilda Mcintyre MD, by Leonard Lagos RN. The scribe's documentation has been prepared under my direction and personally reviewed by me in its entirety. I confirm that the note above accurately reflects all work, treatment, procedures, and medical decision making performed by me.

## 2022-02-23 ENCOUNTER — HOSPITAL ENCOUNTER (OUTPATIENT)
Dept: NON INVASIVE DIAGNOSTICS | Age: 67
Discharge: HOME | End: 2022-02-23
Payer: MEDICARE

## 2022-02-23 DIAGNOSIS — R06.09 DOE (DYSPNEA ON EXERTION): ICD-10-CM

## 2022-02-23 DIAGNOSIS — E78.49 OTHER HYPERLIPIDEMIA: Chronic | ICD-10-CM

## 2022-02-23 DIAGNOSIS — I25.110 CORONARY ARTERY DISEASE INVOLVING NATIVE CORONARY ARTERY OF NATIVE HEART WITH UNSTABLE ANGINA PECTORIS (HCC): Chronic | ICD-10-CM

## 2022-02-23 LAB
LV EF: 59 %
LVEF MODALITY: NORMAL

## 2022-02-23 PROCEDURE — 6360000002 HC RX W HCPCS: Performed by: INTERNAL MEDICINE

## 2022-02-23 PROCEDURE — A9502 TC99M TETROFOSMIN: HCPCS | Performed by: INTERNAL MEDICINE

## 2022-02-23 PROCEDURE — 78452 HT MUSCLE IMAGE SPECT MULT: CPT

## 2022-02-23 PROCEDURE — 93017 CV STRESS TEST TRACING ONLY: CPT | Performed by: INTERNAL MEDICINE

## 2022-02-23 PROCEDURE — 3430000000 HC RX DIAGNOSTIC RADIOPHARMACEUTICAL: Performed by: INTERNAL MEDICINE

## 2022-02-23 RX ADMIN — TETROFOSMIN 30 MILLICURIE: 1.38 INJECTION, POWDER, LYOPHILIZED, FOR SOLUTION INTRAVENOUS at 14:22

## 2022-02-23 RX ADMIN — TETROFOSMIN 10 MILLICURIE: 1.38 INJECTION, POWDER, LYOPHILIZED, FOR SOLUTION INTRAVENOUS at 13:04

## 2022-02-23 RX ADMIN — REGADENOSON 0.4 MG: 0.08 INJECTION, SOLUTION INTRAVENOUS at 14:02

## 2022-02-23 NOTE — PROGRESS NOTES
Instructed on Lexiscan Stress Test Procedure including possible side effects/ adverse reactions. Patient verbalizes  understanding and denies having any questions . See [address] [place] [address] Cardiology

## 2022-02-28 ENCOUNTER — TELEPHONE (OUTPATIENT)
Dept: CARDIOLOGY CLINIC | Age: 67
End: 2022-02-28

## 2022-08-05 NOTE — TELEPHONE ENCOUNTER
Received refill request for Losartan, Metoprolol from [facility].     Last ov: 02/18/2022 LES    Last Refill: 02/10/2022    Next appointment: 08/19/2022 LES

## 2022-08-10 RX ORDER — METOPROLOL TARTRATE 50 MG/1
TABLET, FILM COATED ORAL
Qty: 180 TABLET | Refills: 1 | Status: SHIPPED | OUTPATIENT
Start: 2022-08-10

## 2022-08-10 RX ORDER — LOSARTAN POTASSIUM 25 MG/1
TABLET ORAL
Qty: 90 TABLET | Refills: 1 | Status: SHIPPED | OUTPATIENT
Start: 2022-08-10

## 2023-02-07 RX ORDER — LOSARTAN POTASSIUM 25 MG/1
TABLET ORAL
Qty: 90 TABLET | Refills: 1 | Status: SHIPPED | OUTPATIENT
Start: 2023-02-07

## 2023-02-07 RX ORDER — METOPROLOL TARTRATE 50 MG/1
TABLET, FILM COATED ORAL
Qty: 180 TABLET | Refills: 1 | Status: SHIPPED | OUTPATIENT
Start: 2023-02-07

## 2023-02-07 RX ORDER — PRASUGREL 10 MG/1
TABLET, FILM COATED ORAL
Qty: 90 TABLET | Refills: 3 | Status: SHIPPED | OUTPATIENT
Start: 2023-02-07

## 2023-02-07 NOTE — TELEPHONE ENCOUNTER
Received refill request for Losartan, Metoprolol from  [facility]     Last ov: 2022 LES    Last labs: 2022 Care Everywhere    Last EK2022    Last Refill: 08/10/2022 #180 w/ 1 refill    Next appointment: none

## 2023-02-07 NOTE — TELEPHONE ENCOUNTER
Received refill request for Prasugrel from [facility]     Last ov: 02/18/2022 LES    Last labs: 05/20/2021 Care Everywhere    Last Refill: 02/11/2022 #90 w/ 3 refills    Next appointment: none

## 2023-05-18 RX ORDER — METOPROLOL TARTRATE 50 MG/1
TABLET, FILM COATED ORAL
Qty: 180 TABLET | Refills: 1 | Status: SHIPPED | OUTPATIENT
Start: 2023-05-18

## 2023-05-18 RX ORDER — LOSARTAN POTASSIUM 25 MG/1
TABLET ORAL
Qty: 90 TABLET | Refills: 1 | Status: SHIPPED | OUTPATIENT
Start: 2023-05-18

## 2023-05-18 NOTE — TELEPHONE ENCOUNTER
Received refill request for Losartan, Metoprolol from [facility].     Last ov: 02/18/2022 LES    Last Refill: 02/07/2023    Next appointment: none

## 2023-08-30 RX ORDER — LOSARTAN POTASSIUM 25 MG/1
TABLET ORAL
Qty: 90 TABLET | Refills: 1 | OUTPATIENT
Start: 2023-08-30

## 2023-08-30 RX ORDER — LOSARTAN POTASSIUM 25 MG/1
TABLET ORAL
Qty: 90 TABLET | Refills: 1 | Status: SHIPPED | OUTPATIENT
Start: 2023-08-30

## 2023-11-29 RX ORDER — METOPROLOL TARTRATE 50 MG/1
TABLET, FILM COATED ORAL
Qty: 180 TABLET | Refills: 0 | Status: SHIPPED | OUTPATIENT
Start: 2023-11-29

## 2023-11-29 NOTE — TELEPHONE ENCOUNTER
Received refill request for metoprolol from [facility] pharmacy.     Last ov: 02/18/2022 LES    Last Refill: 05/18/2023 #180 w/ 1 refill    Next appointment: none

## 2024-02-09 PROBLEM — Z87.891 FORMER SMOKER: Status: ACTIVE | Noted: 2024-02-09

## 2024-02-27 NOTE — PROGRESS NOTES
[facility]   Cardiac Follow Up    Referring Provider:  Jorge Luis Campos MD     No chief complaint on file.     History of Present Illness:  Ignacio Rosado is a 68 y.o. male with a history of coronary artery disease/stents, hypertension, hyperlipidemia.      Today, he is here for follow up.       Past Medical History:   has a past medical history of CAD (coronary artery disease), Hyperlipidemia, and Hypertension.    Surgical History:   has a past surgical history that includes partial nephrectomy; Coronary angioplasty with stent (1/4/13); Coronary artery bypass graft; joint replacement; and joint replacement (Right, 2014).     Social History:   reports that he quit smoking about 23 years ago. His smoking use included cigarettes. He has never used smokeless tobacco. He reports current alcohol use. He reports that he does not use drugs.     Family History:  family history includes Heart Disease in his brother, mother, and sister; Stroke in his father.     Home Medications:  Prior to Admission medications    Medication Sig Start Date End Date Taking? Authorizing Provider   metoprolol tartrate (LOPRESSOR) 50 MG tablet TAKE 1 TABLET BY MOUTH TWICE DAILY 11/29/23   Jose Garaz MD   losartan (COZAAR) 25 MG tablet TAKE 1 TABLET BY MOUTH EVERY DAY 8/30/23   Jose Garza MD   prasugrel (EFFIENT) 10 MG TABS TAKE 1 TABLET BY MOUTH EVERY DAY 2/7/23   Jose Garza MD   rosuvastatin (CRESTOR) 40 MG tablet Take 1 tablet by mouth daily 1/21/21   Jose Garza MD   nitroGLYCERIN (NITROSTAT) 0.4 MG SL tablet Place 0.4 mg under the tongue    ProviderBrunilda MD   aspirin 81 MG tablet Take 4 tablets by mouth daily 2/17/17   Sher Euceda MD   albuterol sulfate  (90 BASE) MCG/ACT inhaler Inhale 2 puffs into the lungs every 6 hours as needed for Wheezing    ProviderBrunilda MD        Allergies:  Lisinopril     Review of Systems:   Constitutional: there has been no unanticipated weight 
Provider, MD Brunilda   metoprolol tartrate (LOPRESSOR) 50 MG tablet TAKE 1 TABLET BY MOUTH TWICE DAILY 11/29/23  Yes Jose Garza MD   losartan (COZAAR) 25 MG tablet TAKE 1 TABLET BY MOUTH EVERY DAY 8/30/23  Yes Jose Garza MD   prasugrel (EFFIENT) 10 MG TABS TAKE 1 TABLET BY MOUTH EVERY DAY 2/7/23  Yes Jose Garza MD   nitroGLYCERIN (NITROSTAT) 0.4 MG SL tablet Place 1 tablet under the tongue   Yes ProviderBrunilda MD   aspirin 81 MG tablet Take 4 tablets by mouth daily  Patient taking differently: Take 1 tablet by mouth daily 2/17/17  Yes Sher Euceda MD   albuterol sulfate  (90 BASE) MCG/ACT inhaler Inhale 2 puffs into the lungs every 6 hours as needed for Wheezing   Yes Brunilda Lizama MD        Allergies:  Lisinopril     Review of Systems:   Constitutional: there has been no unanticipated weight loss. There's been no change in energy level, sleep pattern, or activity level.     Eyes: No visual changes or diplopia. No scleral icterus.  ENT: No Headaches, hearing loss or vertigo. No mouth sores or sore throat.  Cardiovascular: Reviewed in HPI  Respiratory: No cough or wheezing, no sputum production. No hematemesis.    Gastrointestinal: No abdominal pain, appetite loss, blood in stools. No change in bowel or bladder habits.  Genitourinary: No dysuria, trouble voiding, or hematuria.  Musculoskeletal:  No gait disturbance, weakness or joint complaints.  Integumentary: No rash or pruritis.  Neurological: No headache, diplopia, change in muscle strength, numbness or tingling. No change in gait, balance, coordination, mood, affect, memory, mentation, behavior.  Psychiatric: No anxiety, no depression.  Endocrine: No malaise, fatigue or temperature intolerance. No excessive thirst, fluid intake, or urination. No tremor.  Hematologic/Lymphatic: No abnormal bruising or bleeding, blood clots or swollen lymph nodes.  Allergic/Immunologic: No nasal congestion or hives.    Physical

## 2024-02-28 ENCOUNTER — OFFICE VISIT (OUTPATIENT)
Dept: CARDIOLOGY CLINIC | Age: 69
End: 2024-02-28
Payer: MEDICARE

## 2024-02-28 VITALS
SYSTOLIC BLOOD PRESSURE: 132 MMHG | HEART RATE: 83 BPM | BODY MASS INDEX: 43.85 KG/M2 | WEIGHT: 279.4 LBS | OXYGEN SATURATION: 98 % | HEIGHT: 67 IN | DIASTOLIC BLOOD PRESSURE: 74 MMHG

## 2024-02-28 DIAGNOSIS — E78.49 OTHER HYPERLIPIDEMIA: Chronic | ICD-10-CM

## 2024-02-28 DIAGNOSIS — Z87.891 FORMER SMOKER: ICD-10-CM

## 2024-02-28 DIAGNOSIS — I25.110 CORONARY ARTERY DISEASE INVOLVING NATIVE CORONARY ARTERY OF NATIVE HEART WITH UNSTABLE ANGINA PECTORIS (HCC): Primary | Chronic | ICD-10-CM

## 2024-02-28 DIAGNOSIS — I25.110 CORONARY ARTERY DISEASE INVOLVING NATIVE CORONARY ARTERY OF NATIVE HEART WITH UNSTABLE ANGINA PECTORIS (HCC): Chronic | ICD-10-CM

## 2024-02-28 DIAGNOSIS — I10 HYPERTENSION, ESSENTIAL: ICD-10-CM

## 2024-02-28 LAB
CHOLEST SERPL-MCNC: 208 MG/DL (ref 0–199)
HDLC SERPL-MCNC: 32 MG/DL (ref 40–60)
LDLC SERPL CALC-MCNC: 141 MG/DL
TRIGL SERPL-MCNC: 173 MG/DL (ref 0–150)
VLDLC SERPL CALC-MCNC: 35 MG/DL

## 2024-02-28 PROCEDURE — 3075F SYST BP GE 130 - 139MM HG: CPT | Performed by: INTERNAL MEDICINE

## 2024-02-28 PROCEDURE — G8427 DOCREV CUR MEDS BY ELIG CLIN: HCPCS | Performed by: INTERNAL MEDICINE

## 2024-02-28 PROCEDURE — G8484 FLU IMMUNIZE NO ADMIN: HCPCS | Performed by: INTERNAL MEDICINE

## 2024-02-28 PROCEDURE — G8417 CALC BMI ABV UP PARAM F/U: HCPCS | Performed by: INTERNAL MEDICINE

## 2024-02-28 PROCEDURE — 99214 OFFICE O/P EST MOD 30 MIN: CPT | Performed by: INTERNAL MEDICINE

## 2024-02-28 PROCEDURE — 1036F TOBACCO NON-USER: CPT | Performed by: INTERNAL MEDICINE

## 2024-02-28 PROCEDURE — 1123F ACP DISCUSS/DSCN MKR DOCD: CPT | Performed by: INTERNAL MEDICINE

## 2024-02-28 PROCEDURE — 3078F DIAST BP <80 MM HG: CPT | Performed by: INTERNAL MEDICINE

## 2024-02-28 PROCEDURE — 3017F COLORECTAL CA SCREEN DOC REV: CPT | Performed by: INTERNAL MEDICINE

## 2024-02-28 RX ORDER — FLUTICASONE FUROATE, UMECLIDINIUM BROMIDE AND VILANTEROL TRIFENATATE 100; 62.5; 25 UG/1; UG/1; UG/1
1 POWDER RESPIRATORY (INHALATION) DAILY
COMMUNITY
Start: 2023-06-27

## 2024-02-28 RX ORDER — PRASUGREL 10 MG/1
10 TABLET, FILM COATED ORAL DAILY
Qty: 90 TABLET | Refills: 3 | Status: SHIPPED | OUTPATIENT
Start: 2024-02-28

## 2024-02-28 RX ORDER — LOSARTAN POTASSIUM 25 MG/1
25 TABLET ORAL DAILY
Qty: 90 TABLET | Refills: 3 | Status: SHIPPED | OUTPATIENT
Start: 2024-02-28

## 2024-02-28 RX ORDER — METOPROLOL TARTRATE 50 MG/1
50 TABLET, FILM COATED ORAL 2 TIMES DAILY
Qty: 180 TABLET | Refills: 3 | Status: SHIPPED | OUTPATIENT
Start: 2024-02-28

## 2024-02-28 RX ORDER — NITROGLYCERIN 0.4 MG/1
0.4 TABLET SUBLINGUAL EVERY 5 MIN PRN
Qty: 25 TABLET | Refills: 0 | Status: SHIPPED | OUTPATIENT
Start: 2024-02-28

## 2024-02-28 NOTE — PATIENT INSTRUCTIONS
Labs today - Lipid Panel - will call you with results and next steps    Follow up with Dr. Garza in  1 year

## 2024-02-29 ENCOUNTER — TELEPHONE (OUTPATIENT)
Dept: CARDIOLOGY CLINIC | Age: 69
End: 2024-02-29

## 2024-02-29 RX ORDER — ATORVASTATIN CALCIUM 40 MG/1
40 TABLET, FILM COATED ORAL DAILY
Qty: 90 TABLET | Refills: 0 | Status: SHIPPED | OUTPATIENT
Start: 2024-02-29

## 2024-02-29 NOTE — TELEPHONE ENCOUNTER
Per LES, I sent lipitor 40 mg daily to his pharmacy. Let him know he needs to start that     I called to notify Ignacio of above information.  No answer, voicemail left requesting a return call.

## 2024-02-29 NOTE — TELEPHONE ENCOUNTER
Spoke with the patient and advised him of message below per LES. Patient voiced understanding. Call complete

## 2024-05-29 RX ORDER — ATORVASTATIN CALCIUM 40 MG/1
40 TABLET, FILM COATED ORAL DAILY
Qty: 90 TABLET | Refills: 0 | Status: SHIPPED | OUTPATIENT
Start: 2024-05-29

## 2024-05-29 NOTE — TELEPHONE ENCOUNTER
Received refill request for atorvastatin from [facility] pharmacy.    Last ov: 02/28/2024 LES    Last Refill: 02/29/2024 #90 w/ 0 refills    Next appointment: 02/27/2025 LES

## 2024-12-13 ENCOUNTER — TELEPHONE (OUTPATIENT)
Dept: CARDIOLOGY CLINIC | Age: 69
End: 2024-12-13

## 2024-12-13 NOTE — TELEPHONE ENCOUNTER
CARDIAC CLEARANCE     What type of procedure are you having?  Port Placement    Which physician is performing your procedure?  Dr. Hero Brown    When is your procedure scheduled for?   Pending    Where are you having this procedure?  [facility]    Are you taking Blood Thinners?  Yes   If so what? (Name/dose/frequesncy)  Effient 10mg, Aspirin 81mg     Does the surgeon want you to stop your blood thinner?  If so for how long?  Yes - Needs LES opinion  Effient - hold 7 days prior  Aspirin  -  hold 5 days prior     Phone Number and Contact Name for Physicians office:   Leda -  [phone]  Fax number to send information:  [phone]

## 2024-12-19 ENCOUNTER — TELEPHONE (OUTPATIENT)
Dept: CARDIOLOGY CLINIC | Age: 69
End: 2024-12-19

## 2024-12-19 NOTE — TELEPHONE ENCOUNTER
Pt is already holding blood thinners, can he continue holding?  CARDIAC CLEARANCE     What type of procedure are you having?  Bone biopsy / sedation  Which physician is performing your procedure?  Jaswinder Brown  When is your procedure scheduled for?  12/24  Where are you having this procedure?  [facility]  Are you taking Blood Thinners?yes   If so what? Effient and Aspirin  (Name/dose/frequesncy)     Does the surgeon want you to stop your blood thinner? Yes  If so for how long? Can pt continue holding as he is holding for a port placement  completed today.  Phone Number and Contact Name for Physicians office:  [phone]  Fax number to send information:  [phone]

## 2024-12-19 NOTE — TELEPHONE ENCOUNTER
Discussed with LAURYN.  Okcurly to continue to hold.  I called and spoke to Leda and notified.  Letter created and printed for LES signature tomorrow and will then fax over.  Leda stated that she was reaching out to Mr. Rosado to notify of this information and have him call the office with any questions and or concerns.  She denied any further questions and/or concerns.

## 2024-12-24 ENCOUNTER — TELEPHONE (OUTPATIENT)
Dept: CARDIOLOGY CLINIC | Age: 69
End: 2024-12-24

## 2025-05-20 NOTE — PROGRESS NOTES
[facility]   Cardiac Follow Up    Referring Provider:  Jorge Luis Campos MD     Chief Complaint   Patient presents with    Cardiac Clearance    Coronary Artery Disease    Hypertension    Hyperlipidemia      History of Present Illness:  Ignacio Rosado is a 69 y.o. male with a history of coronary artery disease/stents, hypertension, hyperlipidemia.        Today, Ignacio is here for a 1 year follow up and is in need of cardiac clearance to have tumors removed.  He is not taking anything for his cholesterol.  He has been taking Prasugrel since his bypass and stents.        Past Medical History:   has a past medical history of CAD (coronary artery disease), Hyperlipidemia, and Hypertension.    Surgical History:   has a past surgical history that includes partial nephrectomy; Coronary angioplasty with stent (1/4/13); Coronary artery bypass graft; joint replacement; and joint replacement (Right, 2014).     Social History:   reports that he quit smoking about 24 years ago. His smoking use included cigarettes. He has been exposed to tobacco smoke. He has never used smokeless tobacco. He reports current alcohol use. He reports that he does not use drugs.     Family History:  family history includes Heart Disease in his brother, mother, and sister; Stroke in his father.     Home Medications:  Prior to Admission medications    Medication Sig Start Date End Date Taking? Authorizing Provider   atorvastatin (LIPITOR) 40 MG tablet TAKE 1 TABLET BY MOUTH DAILY 5/29/24  Yes Jose Garza MD   fluticasone-umeclidin-vilant (TRELEGY ELLIPTA) 100-62.5-25 MCG/ACT AEPB inhaler Inhale 1 puff into the lungs daily 6/27/23  Yes ProviderBrunilda MD   nitroGLYCERIN (NITROSTAT) 0.4 MG SL tablet Place 1 tablet under the tongue every 5 minutes as needed for Chest pain 2/28/24  Yes Jose Garza MD   prasugrel (EFFIENT) 10 MG TABS Take 1 tablet by mouth daily 2/28/24  Yes Jose Garza MD   metoprolol tartrate (LOPRESSOR) 50 MG

## 2025-05-21 ENCOUNTER — OFFICE VISIT (OUTPATIENT)
Dept: CARDIOLOGY CLINIC | Age: 70
End: 2025-05-21
Payer: MEDICARE

## 2025-05-21 VITALS
DIASTOLIC BLOOD PRESSURE: 62 MMHG | WEIGHT: 263 LBS | SYSTOLIC BLOOD PRESSURE: 128 MMHG | BODY MASS INDEX: 41.28 KG/M2 | OXYGEN SATURATION: 98 % | HEIGHT: 67 IN | HEART RATE: 63 BPM

## 2025-05-21 DIAGNOSIS — I25.110 CORONARY ARTERY DISEASE INVOLVING NATIVE CORONARY ARTERY OF NATIVE HEART WITH UNSTABLE ANGINA PECTORIS (HCC): Primary | Chronic | ICD-10-CM

## 2025-05-21 DIAGNOSIS — I10 HYPERTENSION, ESSENTIAL: ICD-10-CM

## 2025-05-21 DIAGNOSIS — Z01.810 PREOPERATIVE CARDIOVASCULAR EXAMINATION: ICD-10-CM

## 2025-05-21 DIAGNOSIS — E78.49 OTHER HYPERLIPIDEMIA: Chronic | ICD-10-CM

## 2025-05-21 DIAGNOSIS — Z95.5 HISTORY OF CORONARY ARTERY STENT PLACEMENT: ICD-10-CM

## 2025-05-21 PROCEDURE — 1036F TOBACCO NON-USER: CPT | Performed by: INTERNAL MEDICINE

## 2025-05-21 PROCEDURE — 3074F SYST BP LT 130 MM HG: CPT | Performed by: INTERNAL MEDICINE

## 2025-05-21 PROCEDURE — 99214 OFFICE O/P EST MOD 30 MIN: CPT | Performed by: INTERNAL MEDICINE

## 2025-05-21 PROCEDURE — 3017F COLORECTAL CA SCREEN DOC REV: CPT | Performed by: INTERNAL MEDICINE

## 2025-05-21 PROCEDURE — 93000 ELECTROCARDIOGRAM COMPLETE: CPT | Performed by: INTERNAL MEDICINE

## 2025-05-21 PROCEDURE — 3078F DIAST BP <80 MM HG: CPT | Performed by: INTERNAL MEDICINE

## 2025-05-21 PROCEDURE — G8417 CALC BMI ABV UP PARAM F/U: HCPCS | Performed by: INTERNAL MEDICINE

## 2025-05-21 PROCEDURE — 1159F MED LIST DOCD IN RCRD: CPT | Performed by: INTERNAL MEDICINE

## 2025-05-21 PROCEDURE — G8427 DOCREV CUR MEDS BY ELIG CLIN: HCPCS | Performed by: INTERNAL MEDICINE

## 2025-05-21 PROCEDURE — 1123F ACP DISCUSS/DSCN MKR DOCD: CPT | Performed by: INTERNAL MEDICINE

## 2025-05-21 RX ORDER — ATORVASTATIN CALCIUM 40 MG/1
40 TABLET, FILM COATED ORAL DAILY
Qty: 90 TABLET | Refills: 3 | Status: SHIPPED | OUTPATIENT
Start: 2025-05-21

## 2025-05-21 NOTE — PATIENT INSTRUCTIONS
Stop Prasugrel  Start Plavix 75 mg daily   Start Lipitor 40 mg daily     Labs in 3 months - Lipid Panel - FASTING blood work    - Stable from a cardiac standpoint.  Okay to proceed with surgery.    - Hold Aspirin and Plavix for 7 days prior to surgery   - Be sure to continue Metoprolol      Follow up with Dr. Garza in 6 months

## 2025-05-23 RX ORDER — LOSARTAN POTASSIUM 25 MG/1
25 TABLET ORAL DAILY
Qty: 90 TABLET | Refills: 3 | Status: SHIPPED | OUTPATIENT
Start: 2025-05-23

## 2025-05-23 RX ORDER — METOPROLOL TARTRATE 50 MG
50 TABLET ORAL 2 TIMES DAILY
Qty: 180 TABLET | Refills: 3 | Status: SHIPPED | OUTPATIENT
Start: 2025-05-23

## 2025-05-23 NOTE — TELEPHONE ENCOUNTER
Requested Prescriptions     Pending Prescriptions Disp Refills    metoprolol tartrate (LOPRESSOR) 50 MG tablet [Pharmacy Med Name: METOPROLOL TARTRATE 50MG TABLETS] 180 tablet 3     Sig: TAKE 1 TABLET BY MOUTH TWICE DAILY      Last OV:  2025 LES    Next OV: 2025 LES    Last EK2025     Last Filled: 2024 LES

## 2025-05-23 NOTE — TELEPHONE ENCOUNTER
Received refill request for Losartan from [facility] pharmacy.    Last ov:05/21/2025 LES    Last labs:12/12/2024 CMP    Last Refill:02/28/2024    Next appointment:11/19/2025 LES